# Patient Record
Sex: FEMALE | Race: BLACK OR AFRICAN AMERICAN | Employment: FULL TIME | URBAN - METROPOLITAN AREA
[De-identification: names, ages, dates, MRNs, and addresses within clinical notes are randomized per-mention and may not be internally consistent; named-entity substitution may affect disease eponyms.]

---

## 2017-03-27 ENCOUNTER — ALLIED HEALTH/NURSE VISIT (OUTPATIENT)
Dept: NURSING | Facility: CLINIC | Age: 22
End: 2017-03-27
Payer: COMMERCIAL

## 2017-03-27 VITALS — HEART RATE: 106 BPM | DIASTOLIC BLOOD PRESSURE: 73 MMHG | SYSTOLIC BLOOD PRESSURE: 137 MMHG

## 2017-03-27 DIAGNOSIS — Z30.42 ENCOUNTER FOR SURVEILLANCE OF INJECTABLE CONTRACEPTIVE: Primary | ICD-10-CM

## 2017-03-27 LAB — BETA HCG QUAL IFA URINE: NEGATIVE

## 2017-03-27 PROCEDURE — 96372 THER/PROPH/DIAG INJ SC/IM: CPT

## 2017-03-27 PROCEDURE — 84703 CHORIONIC GONADOTROPIN ASSAY: CPT | Performed by: NURSE PRACTITIONER

## 2017-03-27 PROCEDURE — 99207 ZZC NO CHARGE NURSE ONLY: CPT

## 2017-03-27 NOTE — MR AVS SNAPSHOT
"              After Visit Summary   3/27/2017    Faith Navarro    MRN: [id number]           Patient Information     Date Of Birth          1995        Visit Information        Provider Department      3/27/2017 4:00 PM LARRY NURSE AB [facility]        Today's Diagnoses     Encounter for surveillance of injectable contraceptive    -  1       Follow-ups after your visit        Who to contact     If you have questions or need follow up information about today's clinic visit or your schedule please contact [facility] directly at [phone].  Normal or non-critical lab and imaging results will be communicated to you by MyChart, letter or phone within 4 business days after the clinic has received the results. If you do not hear from us within 7 days, please contact the clinic through Wholesome Petshart or phone. If you have a critical or abnormal lab result, we will notify you by phone as soon as possible.  Submit refill requests through KOTURA or call your pharmacy and they will forward the refill request to us. Please allow 3 business days for your refill to be completed.          Additional Information About Your Visit        MyChart Information     KOTURA lets you send messages to your doctor, view your test results, renew your prescriptions, schedule appointments and more. To sign up, go to [email or web address] . Click on \"Log in\" on the left side of the screen, which will take you to the Welcome page. Then click on \"Sign up Now\" on the right side of the page.     You will be asked to enter the access code listed below, as well as some personal information. Please follow the directions to create your username and password.     Your access code is: [email or web address]  Expires: 2017  4:35 PM     Your access code will  in 90 days. If you need help or a new code, please call your [facility] or [phone].        Care EveryWhere ID     This is your Care EveryWhere ID. This could be used " by other organizations to access your [facility] medical records  [id number]        Your Vitals Were     Pulse                   106            Blood Pressure from Last 3 Encounters:   03/27/17 137/73   10/03/16 122/68   09/16/16 106/66    Weight from Last 3 Encounters:   10/03/16 134 lb 14.4 oz (61.2 kg) (64 %)*   09/16/16 135 lb 3.2 oz (61.3 kg) (64 %)*   06/20/16 136 lb (61.7 kg) (66 %)*     * Growth percentiles are based on [facility] 2-20 Years data.              We Performed the Following     Beta HCG qual IFA urine     INJECTION INTRAMUSCULAR OR SUB-Q     Medroxyprogesterone inj  1mg   (Depo Provera J-Code)        Primary Care Provider Office Phone # Fax #    BAUDILIO Ortiz [phone]       [address] [place] [address]        Thank you!     Thank you for choosing [facility]  for your care. Our goal is always to provide you with excellent care. Hearing back from our patients is one way we can continue to improve our services. Please take a few minutes to complete the written survey that you may receive in the mail after your visit with us. Thank you!             Your Updated Medication List - Protect others around you: Learn how to safely use, store and throw away your medicines at www.disposemymeds.org.          This list is accurate as of: 3/27/17  4:35 PM.  Always use your most recent med list.                   Brand Name Dispense Instructions for use    medroxyPROGESTERone 150 MG/ML injection    DEPO-PROVERA    3 mL    Inject 1 mL (150 mg) into the muscle every 3 months

## 2017-03-27 NOTE — PROGRESS NOTES
BLOOD PRESSURE: 137/73    DATE OF LAST PAP or ANNUAL EXAM: No results found for: PAP  URINE HCG:negative    The following medication was given:     MEDICATION: Depo Provera 150mg  ROUTE: IM  SITE: RUQ - Gluteus  : [employer]  LOT #: [id number]  EXPIRATION: 8-1-2019  NEXT INJECTION DUE: 6-12-17 to 6-26-17  Provider: ENMA Douglas CMA([facility])

## 2017-08-02 ENCOUNTER — ALLIED HEALTH/NURSE VISIT (OUTPATIENT)
Dept: NURSING | Facility: CLINIC | Age: 22
End: 2017-08-02
Payer: COMMERCIAL

## 2017-08-02 VITALS — SYSTOLIC BLOOD PRESSURE: 120 MMHG | DIASTOLIC BLOOD PRESSURE: 60 MMHG

## 2017-08-02 DIAGNOSIS — Z30.013 ENCOUNTER FOR INITIAL PRESCRIPTION OF INJECTABLE CONTRACEPTIVE: ICD-10-CM

## 2017-08-02 DIAGNOSIS — Z30.42 ENCOUNTER FOR SURVEILLANCE OF INJECTABLE CONTRACEPTIVE: Primary | ICD-10-CM

## 2017-08-02 LAB — BETA HCG QUAL IFA URINE: NEGATIVE

## 2017-08-02 PROCEDURE — 96372 THER/PROPH/DIAG INJ SC/IM: CPT

## 2017-08-02 PROCEDURE — 84703 CHORIONIC GONADOTROPIN ASSAY: CPT | Performed by: NURSE PRACTITIONER

## 2017-08-02 PROCEDURE — 99207 ZZC NO CHARGE NURSE ONLY: CPT

## 2017-08-02 RX ORDER — MEDROXYPROGESTERONE ACETATE 150 MG/ML
150 INJECTION, SUSPENSION INTRAMUSCULAR
Qty: 3 ML | Refills: 3 | OUTPATIENT
Start: 2017-08-02 | End: 2019-09-11

## 2017-08-02 NOTE — NURSING NOTE
"Chief Complaint   Patient presents with     Imm/Inj     Depo Provera inj       Initial /60  Breastfeeding? No Estimated body mass index is 21.77 kg/(m^2) as calculated from the following:    Height as of 10/3/16: 5' 6\" (1.676 m).    Weight as of 10/3/16: 134 lb 14.4 oz (61.2 kg).  Medication Reconciliation: complete   Tiesha Trotter CMA ([facility])      "

## 2017-08-02 NOTE — MR AVS SNAPSHOT
"              After Visit Summary   2017    Faith Navarro    MRN: [id number]           Patient Information     Date Of Birth          1995        Visit Information        Provider Department      2017 11:30 AM LARRY NURSE AB [facility]        Today's Diagnoses     Encounter for surveillance of injectable contraceptive    -  1    Encounter for initial prescription of injectable contraceptive           Follow-ups after your visit        Who to contact     If you have questions or need follow up information about today's clinic visit or your schedule please contact [facility] directly at [phone].  Normal or non-critical lab and imaging results will be communicated to you by [employer]hart, letter or phone within 4 business days after the clinic has received the results. If you do not hear from us within 7 days, please contact the clinic through Zixit or phone. If you have a critical or abnormal lab result, we will notify you by phone as soon as possible.  Submit refill requests through CardinalCommerce or call your pharmacy and they will forward the refill request to us. Please allow 3 business days for your refill to be completed.          Additional Information About Your Visit        MyChart Information     CardinalCommerce lets you send messages to your doctor, view your test results, renew your prescriptions, schedule appointments and more. To sign up, go to [email or web address] . Click on \"Log in\" on the left side of the screen, which will take you to the Welcome page. Then click on \"Sign up Now\" on the right side of the page.     You will be asked to enter the access code listed below, as well as some personal information. Please follow the directions to create your username and password.     Your access code is: [email or web address]  Expires: 2017  2:32 PM     Your access code will  in 90 days. If you need help or a new code, please call your [facility] or [phone].        Care " EveryWhere ID     This is your Care EveryWhere ID. This could be used by other organizations to access your [facility] medical records  [id number]        Your Vitals Were     Breastfeeding?                   No            Blood Pressure from Last 3 Encounters:   08/02/17 120/60   03/27/17 137/73   10/03/16 122/68    Weight from Last 3 Encounters:   10/03/16 134 lb 14.4 oz (61.2 kg) (64 %)*   09/16/16 135 lb 3.2 oz (61.3 kg) (64 %)*   06/20/16 136 lb (61.7 kg) (66 %)*     * Growth percentiles are based on [facility] 2-20 Years data.              We Performed the Following     Beta HCG qual IFA urine          Where to get your medicines      Some of these will need a paper prescription and others can be bought over the counter.  Ask your nurse if you have questions.     You don't need a prescription for these medications     medroxyPROGESTERone 150 MG/ML injection          Primary Care Provider Office Phone # Fax #    BAUDILIO Ortiz [phone]       [facility] [place] [address]        Equal Access to Services     [facility]: Hadii josie gibbonso Sojosé luis, waaxda luqadaha, qaybta kaalmada jude, jones mendoza . So [facility] [phone].    ATENCIÓN: Si habla español, tiene a soria disposición servicios gratuitos de asistencia lingüística. [facility] [phone].    We comply with applicable federal civil rights laws and [place] laws. We do not discriminate on the basis of race, color, national origin, age, disability sex, sexual orientation or gender identity.            Thank you!     Thank you for choosing [facility]  for your care. Our goal is always to provide you with excellent care. Hearing back from our patients is one way we can continue to improve our services. Please take a few minutes to complete the written survey that you may receive in the mail after your visit with us. Thank you!             Your Updated Medication List -  Protect others around you: Learn how to safely use, store and throw away your medicines at www.disposemymeds.org.          This list is accurate as of: 8/2/17 11:59 PM.  Always use your most recent med list.                   Brand Name Dispense Instructions for use Diagnosis    medroxyPROGESTERone 150 MG/ML injection    DEPO-PROVERA    3 mL    Inject 1 mL (150 mg) into the muscle every 3 months    Encounter for initial prescription of injectable contraceptive

## 2017-08-02 NOTE — Clinical Note
Please renew prescription for Depo Provera injection prior to 11:30 appointment today. Tiesha Trotter CMA ([facility])

## 2017-11-10 ENCOUNTER — ALLIED HEALTH/NURSE VISIT (OUTPATIENT)
Dept: NURSING | Facility: CLINIC | Age: 22
End: 2017-11-10
Payer: COMMERCIAL

## 2017-11-10 VITALS
SYSTOLIC BLOOD PRESSURE: 108 MMHG | BODY MASS INDEX: 23.61 KG/M2 | HEART RATE: 79 BPM | DIASTOLIC BLOOD PRESSURE: 74 MMHG | WEIGHT: 146.3 LBS

## 2017-11-10 PROCEDURE — 96372 THER/PROPH/DIAG INJ SC/IM: CPT

## 2017-11-10 PROCEDURE — 99207 ZZC NO CHARGE NURSE ONLY: CPT

## 2017-11-10 NOTE — PROGRESS NOTES
BP: 108/74    LAST PAP/EXAM: No results found for: PAP  URINE HCG:negative    The following medication was given:     MEDICATION: Depo Provera 150mg  ROUTE: IM  SITE: Ventrogluteal - Right  : Teva  LOT #: [id number]    EXP:04/30/2019  NDC:7728-3570-68  NEXT INJECTION DUE: 1/26/18 - 2/9/18   Provider: Magdalena Nuñez MA// November 10, 2017 10:56 AM

## 2017-11-10 NOTE — NURSING NOTE
"Chief Complaint   Patient presents with     Allied Health Visit       Initial /74  Pulse 79  Wt 146 lb 4.8 oz (66.4 kg)  BMI 23.61 kg/m2 Estimated body mass index is 23.61 kg/(m^2) as calculated from the following:    Height as of 10/3/16: 5' 6\" (1.676 m).    Weight as of this encounter: 146 lb 4.8 oz (66.4 kg).  Medication Reconciliation: complete   Magdalena Nuñez MA// November 10, 2017 11:00 AM          "

## 2017-11-10 NOTE — MR AVS SNAPSHOT
"              After Visit Summary   11/10/2017    Faith Navarro    MRN: [id number]           Patient Information     Date Of Birth          1995        Visit Information        Provider Department      11/10/2017 10:30 AM LARRY NURSE AB [facility]        Today's Diagnoses     Contraception    -  1       Follow-ups after your visit        Your next 10 appointments already scheduled     Nov 17, 2017 10:30 AM CST   [facility]    [address]  [address]  [facility] [facility] [address] [phone]           Bring a current list of meds and any records pertaining to this visit. For Physicals, please bring immunization records and any forms needing to be filled out. Please arrive 10 minutes early to complete paperwork.              Who to contact     If you have questions or need follow up information about today's clinic visit or your schedule please contact [facility] directly at [phone].  Normal or non-critical lab and imaging results will be communicated to you by Bindohart, letter or phone within 4 business days after the clinic has received the results. If you do not hear from us within 7 days, please contact the clinic through DiJiPOPt or phone. If you have a critical or abnormal lab result, we will notify you by phone as soon as possible.  Submit refill requests through MeritBuilder or call your pharmacy and they will forward the refill request to us. Please allow 3 business days for your refill to be completed.          Additional Information About Your Visit        BindoharODK Media Information     MeritBuilder lets you send messages to your doctor, view your test results, renew your prescriptions, schedule appointments and more. To sign up, go to [email or web address] . Click on \"Log in\" on the left side of the screen, which will take you to the Welcome page. Then click on \"Sign up Now\" on the right " side of the page.     You will be asked to enter the access code listed below, as well as some personal information. Please follow the directions to create your username and password.     Your access code is: [email or web address]  Expires: 2017  1:32 PM     Your access code will  in 90 days. If you need help or a new code, please call your [place] clinic or [phone].        Care EveryWhere ID     This is your Care EveryWhere ID. This could be used by other organizations to access your [place] medical records  [id number]        Your Vitals Were     Pulse BMI (Body Mass Index)                79 23.61 kg/m2           Blood Pressure from Last 3 Encounters:   11/10/17 108/74   17 120/60   17 137/73    Weight from Last 3 Encounters:   11/10/17 146 lb 4.8 oz (66.4 kg)   10/03/16 134 lb 14.4 oz (61.2 kg) (64 %)*   16 135 lb 3.2 oz (61.3 kg) (64 %)*     * Growth percentiles are based on [facility] 2-20 Years data.              We Performed the Following     INJECTION INTRAMUSCULAR OR SUB-Q     Medroxyprogesterone inj  1mg   (Depo Provera J-Code)        Primary Care Provider Office Phone # Fax #    Bushra BAUDILIO Langley [phone] [address] [place] [address]        Equal Access to Services     : Hadii aad ku hadasho Soomaali, waaxda luqadaha, qaybta kaalmada alvinda, jones mendoza . So [facility] [phone].    ATENCIÓN: Si habla español, tiene a soria disposición servicios gratuitos de asistencia lingüística. Llame al [phone].    We comply with applicable federal civil rights laws and [place] laws. We do not discriminate on the basis of race, color, national origin, age, disability, sex, sexual orientation, or gender identity.            Thank you!     Thank you for choosing [facility]  for your care. Our goal is always to provide you with excellent care. Hearing back from our patients is one way we can  continue to improve our services. Please take a few minutes to complete the written survey that you may receive in the mail after your visit with us. Thank you!             Your Updated Medication List - Protect others around you: Learn how to safely use, store and throw away your medicines at www.disposemymeds.org.          This list is accurate as of: 11/10/17 11:01 AM.  Always use your most recent med list.                   Brand Name Dispense Instructions for use Diagnosis    medroxyPROGESTERone 150 MG/ML injection    DEPO-PROVERA    3 mL    Inject 1 mL (150 mg) into the muscle every 3 months    Encounter for initial prescription of injectable contraceptive

## 2019-03-20 ENCOUNTER — NURSE TRIAGE (OUTPATIENT)
Dept: NURSING | Facility: CLINIC | Age: 24
End: 2019-03-20

## 2019-03-20 NOTE — TELEPHONE ENCOUNTER
FNA triage call :   Presenting problem : Sinus headache and stuffy nose ,  suspect low grade fever but no thermometer , and productive cough for last 2 days , . Currently : 1&0 is ok ,   Guideline used : Sinus pain or congestion - adult   Disposition and recommendations : have someone drive you to ED but Pt insists on appt and sent to .   Caller verbalizes understanding and denies further questions and will call back if further symptoms to triage or questions  . Radha Arciniega RN  - [place] Nurse Advisor     Reason for Disposition    [1] SEVERE headache AND [2] fever    Additional Information    Negative: Severe difficulty breathing (e.g., struggling for each breath, speaks in single words)    Negative: Sounds like a life-threatening emergency to the triager    Negative: [1] Sinus infection AND [2] taking an antibiotic AND [3] symptoms continue    Negative: [1] Difficulty breathing AND [2] not from stuffy nose (e.g., not relieved by cleaning out the nose)    Protocols used: SINUS PAIN OR CONGESTION-ADULT-

## 2019-03-21 ENCOUNTER — OFFICE VISIT (OUTPATIENT)
Dept: PEDIATRICS | Facility: CLINIC | Age: 24
End: 2019-03-21
Payer: COMMERCIAL

## 2019-03-21 VITALS
DIASTOLIC BLOOD PRESSURE: 56 MMHG | TEMPERATURE: 97.9 F | HEART RATE: 85 BPM | OXYGEN SATURATION: 100 % | SYSTOLIC BLOOD PRESSURE: 108 MMHG | RESPIRATION RATE: 16 BRPM | HEIGHT: 66 IN | WEIGHT: 136.2 LBS | BODY MASS INDEX: 21.89 KG/M2

## 2019-03-21 DIAGNOSIS — J06.9 UPPER RESPIRATORY TRACT INFECTION, UNSPECIFIED TYPE: Primary | ICD-10-CM

## 2019-03-21 PROCEDURE — 99213 OFFICE O/P EST LOW 20 MIN: CPT | Performed by: FAMILY MEDICINE

## 2019-03-21 RX ORDER — AZITHROMYCIN 250 MG/1
TABLET, FILM COATED ORAL
Qty: 6 TABLET | Refills: 0 | Status: SHIPPED | OUTPATIENT
Start: 2019-03-21 | End: 2019-09-11

## 2019-03-21 ASSESSMENT — MIFFLIN-ST. JEOR: SCORE: 1391.61

## 2019-03-21 NOTE — PROGRESS NOTES
"  CHIEF COMPLAINT    Sinus congestion and cough for about 1 week.      HISTORY    History as above.  She does not have a great deal of sore throat.  No ear pain.  Minimal sputum production.  No fevers.      Patient Active Problem List   Diagnosis     Periumbilical abdominal pain       REVIEW OF SYSTEMS    No fever.  No S OB.  No chest pain.  No nausea or abdominal pain.      History reviewed. No pertinent past medical history.      EXAM  /56 (BP Location: Right arm, Patient Position: Chair, Cuff Size: Adult Regular)   Pulse 85   Temp 97.9  F (36.6  C) (Oral)   Resp 16   Ht 1.664 m (5' 5.5\")   Wt 61.8 kg (136 lb 3.2 oz)   SpO2 100%   BMI 22.32 kg/m      Tympanic membranes normal.  Nasal membranes congested.  Pharynx without inflammation.  Mildly enlarged anterior cervical nodes.  Chest clear.      (J06.9) Upper respiratory tract infection, unspecified type  (primary encounter diagnosis)  Comment:   Plan: azithromycin (ZITHROMAX) 250 MG tablet              "

## 2019-06-16 ENCOUNTER — NURSE TRIAGE (OUTPATIENT)
Dept: NURSING | Facility: CLINIC | Age: 24
End: 2019-06-16

## 2019-06-17 ENCOUNTER — OFFICE VISIT (OUTPATIENT)
Dept: PEDIATRICS | Facility: CLINIC | Age: 24
End: 2019-06-17
Payer: COMMERCIAL

## 2019-06-17 VITALS
DIASTOLIC BLOOD PRESSURE: 54 MMHG | WEIGHT: 139.2 LBS | HEIGHT: 66 IN | TEMPERATURE: 99.4 F | SYSTOLIC BLOOD PRESSURE: 100 MMHG | BODY MASS INDEX: 22.37 KG/M2 | HEART RATE: 94 BPM | OXYGEN SATURATION: 100 %

## 2019-06-17 DIAGNOSIS — Z30.013 ENCOUNTER FOR INITIAL PRESCRIPTION OF INJECTABLE CONTRACEPTIVE: Primary | ICD-10-CM

## 2019-06-17 PROCEDURE — 99213 OFFICE O/P EST LOW 20 MIN: CPT | Mod: 25 | Performed by: NURSE PRACTITIONER

## 2019-06-17 PROCEDURE — 96372 THER/PROPH/DIAG INJ SC/IM: CPT | Performed by: NURSE PRACTITIONER

## 2019-06-17 RX ORDER — MEDROXYPROGESTERONE ACETATE 150 MG/ML
150 INJECTION, SUSPENSION INTRAMUSCULAR
Status: ACTIVE | OUTPATIENT
Start: 2019-06-17

## 2019-06-17 RX ADMIN — MEDROXYPROGESTERONE ACETATE 150 MG: 150 INJECTION, SUSPENSION INTRAMUSCULAR at 11:25

## 2019-06-17 ASSESSMENT — MIFFLIN-ST. JEOR: SCORE: 1400.22

## 2019-06-17 NOTE — PROGRESS NOTES
"Tarah Navarro is a 24 year old female who presents to clinic today for the following health issues:    Contraception        Birth Control---Depo    Used to be on depo - stopped last year. Hasn't had sex since LMP, which was on menses now. Has never had pap. She had STI screening 4 month ago.       Patient Active Problem List   Diagnosis     Periumbilical abdominal pain     Past Surgical History:   Procedure Laterality Date     HERNIA REPAIR         Social History     Tobacco Use     Smoking status: Never Smoker     Smokeless tobacco: Never Used   Substance Use Topics     Alcohol use: No     Alcohol/week: 0.0 oz     Family History   Problem Relation Age of Onset     Asthma Maternal Grandfather      Unknown/Adopted Father      Diabetes No family hx of      Coronary Artery Disease No family hx of      Hypertension No family hx of      Hyperlipidemia No family hx of      Breast Cancer No family hx of      Cancer - colorectal No family hx of      Cerebrovascular Disease No family hx of      Thyroid Disease No family hx of            -------------------------------------  Reviewed and updated as needed this visit by Provider  Tobacco  Allergies  Meds  Problems  Med Hx  Surg Hx  Fam Hx         Review of Systems   ROS COMP: Constitutional, HEENT, cardiovascular, pulmonary, gi and gu systems are negative, except as otherwise noted.      Objective    /54 (BP Location: Right arm, Patient Position: Sitting, Cuff Size: Adult Regular)   Pulse 94   Temp 99.4  F (37.4  C) (Oral)   Ht 1.664 m (5' 5.5\")   Wt 63.1 kg (139 lb 3.2 oz)   LMP 05/12/2019 (Approximate)   SpO2 100%   BMI 22.81 kg/m    Body mass index is 22.81 kg/m .  Physical Exam   GENERAL: healthy, alert and no distress            Assessment & Plan     1. Encounter for initial prescription of injectable contraceptive  She has been on depo before, understands med side effects, roisks, benefits. We discussed depo does not protect against " STIs. We disucssed using OTC EC if she should miss injection dates and she has unprotected intercourse. She is up to date with STI screening through school, but hasn't had pap yet. Will schedule this  - medroxyPROGESTERone (DEPO-PROVERA) syringe 150 mg  - INJECTION INTRAMUSCULAR OR SUB-Q           Return in about 3 months (around 9/17/2019) for Annual Preventative Exam.    BAUDILIO [facility]

## 2019-09-11 ENCOUNTER — OFFICE VISIT (OUTPATIENT)
Dept: OPTOMETRY | Facility: CLINIC | Age: 24
End: 2019-09-11
Payer: COMMERCIAL

## 2019-09-11 ENCOUNTER — APPOINTMENT (OUTPATIENT)
Dept: OPTOMETRY | Facility: CLINIC | Age: 24
End: 2019-09-11
Payer: COMMERCIAL

## 2019-09-11 DIAGNOSIS — H52.13 MYOPIA OF BOTH EYES: Primary | ICD-10-CM

## 2019-09-11 DIAGNOSIS — T15.01XA FOREIGN BODY OF RIGHT CORNEA, INITIAL ENCOUNTER: ICD-10-CM

## 2019-09-11 PROCEDURE — 92340 FIT SPECTACLES MONOFOCAL: CPT | Performed by: OPTOMETRIST

## 2019-09-11 PROCEDURE — 92004 COMPRE OPH EXAM NEW PT 1/>: CPT | Performed by: OPTOMETRIST

## 2019-09-11 PROCEDURE — 92015 DETERMINE REFRACTIVE STATE: CPT | Performed by: OPTOMETRIST

## 2019-09-11 ASSESSMENT — VISUAL ACUITY
OD_SC: 20/200
METHOD: SNELLEN - LINEAR
OS_SC: 20/20-1
OD_SC: 20/30
OS_SC+: -1
OS_SC: 20/125

## 2019-09-11 ASSESSMENT — REFRACTION_MANIFEST
OD_AXIS: 019
OD_CYLINDER: SPHERE
OD_SPHERE: -3.00
OS_SPHERE: -2.75
OS_SPHERE: -2.25
OS_CYLINDER: SPHERE
OS_CYLINDER: SPHERE
OD_CYLINDER: +0.75
OD_SPHERE: -3.00
METHOD_AUTOREFRACTION: 1

## 2019-09-11 ASSESSMENT — SLIT LAMP EXAM - LIDS
COMMENTS: NORMAL
COMMENTS: NORMAL

## 2019-09-11 ASSESSMENT — CUP TO DISC RATIO
OS_RATIO: 0.5
OD_RATIO: 0.5

## 2019-09-11 ASSESSMENT — TONOMETRY
IOP_METHOD: APPLANATION
OD_IOP_MMHG: 17
OS_IOP_MMHG: 17

## 2019-09-11 ASSESSMENT — EXTERNAL EXAM - RIGHT EYE: OD_EXAM: NORMAL

## 2019-09-11 ASSESSMENT — CONF VISUAL FIELD: COMMENTS: PLEASE RETEST

## 2019-09-11 ASSESSMENT — EXTERNAL EXAM - LEFT EYE: OS_EXAM: NORMAL

## 2019-09-11 NOTE — PROGRESS NOTES
Chief Complaint   Patient presents with     Annual Eye Exam       recently pulled over for driving at night , she was all over the road , lost glasses about a year ago, R eye irritation for about 2 years    Last Eye Exam: 2 years  Dilated Previously: Yes    What are you currently using to see?  does not use glasses x 1 year gives patient headache       Distance Vision Acuity: Noticed gradual change in both eyes    Near Vision Acuity: Satisfied with vision while reading  unaided    Eye Comfort: itchy tearing , light sensitive    Do you use eye drops? : No  Occupation or Hobbies: [employer]        Medical, surgical and family histories reviewed and updated 9/11/2019.       OBJECTIVE: See Ophthalmology exam    ASSESSMENT:    ICD-10-CM    1. Myopia of both eyes H52.13 EYE EXAM (SIMPLE-NONBILLABLE)     REFRACTION   2. Foreign body of right cornea, initial encounter T15.01XA         Plan:   1. Faith was advised of todays exam findings  2. Removed foreign body with sterile golf spud under topical anaesthetic.  3.  instilled erythromycin ointment in lower lid in the office  4. Begin ointment tonight at bedtime and use three times daily for 2-3 days  5. Call or rtc prn if these symptoms worsen or fail to improve.  Patient advised, should not be driving without prescription     Patient Instructions   Use antibiotic three times daily for the next 2-3 days to prevent infection  Get glasses and return to clinic for a dilation     Ashley Brito OD

## 2019-09-11 NOTE — LETTER
9/11/2019         RE: Faith Navarro  [address] [place] [address]        Dear Colleague,    Thank you [facility]arro[facility]. Please see a copy of my visit note below.    Chief Complaint   Patient presents with     Annual Eye Exam       recently pulled over for driving at night , she was all over the road , lost glasses about a year ago, R eye irritation for about 2 years    Last Eye Exam: 2 years  Dilated Previously: Yes    What are you currently using to see?  does not use glasses x 1 year gives patient headache       Distance Vision Acuity: Noticed gradual change in both eyes    Near Vision Acuity: Satisfied with vision while reading  unaided    Eye Comfort: itchy tearing , light sensitive    Do you use eye drops? : No  Occupation or Hobbies: [employer]        Medical, surgical and family histories reviewed and updated 9/11/2019.       OBJECTIVE: See Ophthalmology exam    ASSESSMENT:    ICD-10-CM    1. Myopia of both eyes H52.13 EYE EXAM (SIMPLE-NONBILLABLE)     REFRACTION   2. Foreign body of right cornea, initial encounter T15.01XA         Plan:   1. Faith was advised of todays exam findings  2. Removed foreign body with sterile golf spud under topical anaesthetic.  3.  instilled erythromycin ointment in lower lid in the office  4. Begin ointment tonight at bedtime and use three times daily for 2-3 days  5. Call or rtc prn if these symptoms worsen or fail to improve.  Patient advised, should not be driving without prescription     Patient Instructions   Use antibiotic three times daily for the next 2-3 days to prevent infection  Get glasses and return to clinic for a dilation     Ashley Brito OD     Again, thank you for allowing me to participate in the care of your patient.        Sincerely,        Ashley Brito, OD

## 2019-09-11 NOTE — PATIENT INSTRUCTIONS
Use antibiotic three times daily for the next 2-3 days to prevent infection  Get glasses and return to clinic for a dilation

## 2019-09-12 ENCOUNTER — ANCILLARY PROCEDURE (OUTPATIENT)
Dept: GENERAL RADIOLOGY | Facility: CLINIC | Age: 24
End: 2019-09-12
Payer: COMMERCIAL

## 2019-09-12 DIAGNOSIS — A15.0 TB (PULMONARY TUBERCULOSIS): ICD-10-CM

## 2019-09-12 PROCEDURE — 71046 X-RAY EXAM CHEST 2 VIEWS: CPT

## 2019-12-30 ENCOUNTER — TELEPHONE (OUTPATIENT)
Dept: PEDIATRICS | Facility: CLINIC | Age: 24
End: 2019-12-30

## 2019-12-30 NOTE — LETTER
December 30, 2019      Faith Navarro  [address] [place] [address]        Dear Faith,       We care about your health and have reviewed your health plan including your medical conditions, medications, and lab results.  Based on this review, it is recommended that you follow up regarding the following health topic(s):  -Cervical Cancer Screening    We recommend you take the following action(s):  -schedule a PAP SMEAR EXAM which is due.  Please disregard this reminder if you have had this exam elsewhere within the last year.  It would be helpful for us to have a copy of your recent pap smear report to update your records.     Please call us at the [facility] - [phone] (or use ClassPass) to address the above recommendations.     Thank you for trusting [facility] and we appreciate the opportunity to serve you.  We look forward to supporting your healthcare needs in the future.    Healthy Regards,    Your Health Care Team  [facility]

## 2019-12-30 NOTE — TELEPHONE ENCOUNTER
Panel Management Review          Composite cancer screening  Chart review shows that this patient is due/due soon for the following Pap Smear  Summary:    Patient is due/failing the following:   PAP    Action needed:   Patient needs office visit for pap smear.    Type of outreach:    Sent letter.    Questions for provider review:    None                                                                                                                                    Sylvia Coy CMA([facility])

## 2020-03-05 ENCOUNTER — ANCILLARY PROCEDURE (OUTPATIENT)
Dept: GENERAL RADIOLOGY | Facility: CLINIC | Age: 25
End: 2020-03-05
Attending: PHYSICIAN ASSISTANT
Payer: OTHER MISCELLANEOUS

## 2020-03-05 ENCOUNTER — OFFICE VISIT (OUTPATIENT)
Dept: URGENT CARE | Facility: URGENT CARE | Age: 25
End: 2020-03-05
Payer: OTHER MISCELLANEOUS

## 2020-03-05 VITALS
HEART RATE: 75 BPM | BODY MASS INDEX: 22.78 KG/M2 | RESPIRATION RATE: 20 BRPM | SYSTOLIC BLOOD PRESSURE: 118 MMHG | DIASTOLIC BLOOD PRESSURE: 66 MMHG | WEIGHT: 139 LBS | OXYGEN SATURATION: 98 % | TEMPERATURE: 97.8 F

## 2020-03-05 DIAGNOSIS — M54.50 ACUTE MIDLINE LOW BACK PAIN WITHOUT SCIATICA: Primary | ICD-10-CM

## 2020-03-05 DIAGNOSIS — M54.50 ACUTE MIDLINE LOW BACK PAIN WITHOUT SCIATICA: ICD-10-CM

## 2020-03-05 PROCEDURE — 72080 X-RAY EXAM THORACOLMB 2/> VW: CPT

## 2020-03-05 PROCEDURE — 99214 OFFICE O/P EST MOD 30 MIN: CPT | Performed by: PHYSICIAN ASSISTANT

## 2020-03-05 RX ORDER — CYCLOBENZAPRINE HCL 10 MG
10 TABLET ORAL 3 TIMES DAILY PRN
Qty: 30 TABLET | Refills: 0 | Status: SHIPPED | OUTPATIENT
Start: 2020-03-05 | End: 2020-03-15

## 2020-03-05 RX ORDER — IBUPROFEN 800 MG/1
800 TABLET, FILM COATED ORAL EVERY 8 HOURS PRN
Qty: 21 TABLET | Refills: 0 | Status: SHIPPED | OUTPATIENT
Start: 2020-03-05 | End: 2020-03-12

## 2020-03-05 RX ORDER — IBUPROFEN 200 MG
800 TABLET ORAL ONCE
Status: COMPLETED | OUTPATIENT
Start: 2020-03-05 | End: 2020-03-05

## 2020-03-05 RX ADMIN — Medication 800 MG: at 13:03

## 2020-03-05 NOTE — LETTER
[facility]  [address]  [address]  [place] [address]  Phone: [phone]  Fax: [phone]    March 5, 2020        Faith Navarro  [address] [place] [address]          To whom it may concern:    RE: Faith Navarro    Patient was seen and treated today at our clinic.  Please excuse absences on 3/5 and 3/6.  Please limit Ms. Navarro to seated work without lifting until follow up with Sports Medicine next week.     Please contact me for questions or concerns.      Sincerely,        Gal Romero PA-C

## 2020-03-05 NOTE — PATIENT INSTRUCTIONS
Patient Education     Back Care Tips    Caring for your back  These are things you can do to prevent a recurrence of acute back pain and to reduce symptoms from chronic back pain:    Stay at a healthy weight. If you are overweight, losing weight will help most types of back pain.    Exercise is an important part of recovery from most types of back pain. The muscles behind and in front of the spine support the back. This means strengthening both the back muscles and the abdominal muscles will provide better support for your spine.     Swimming and brisk walking are good overall exercises to improve your fitness level.    Practice safe lifting methods (see below).    Practice good posture when sitting, standing, and walking. Don't sit for a long time. This puts more stress on the lower back than standing or walking.    Wear quality shoes with good arch support. Foot and ankle alignment can affect back symptoms. Don't wear high heels.    Therapeutic massage can help relax the back muscles without stretching them.    During the first 24 to 72 hours after an acute injury or flare-up of chronic back pain, put an ice pack on the painful area for 20 minutes and then remove it for 20 minutes. Do thisover a period of 60 to 90 minutes, or several times a day. As a safety precaution, don't use a heating pad at bedtime. Sleeping on a heating pad can lead to skin burns or tissue damage.    You can alternate using ice and heat.  Medicines  Talk with your healthcare provider before using medicines, especially if you have other health problems or are taking other medicines.    You may use over-the-counter medicines, such as acetaminophen, ibuprofen, or naprosyn to control pain, unless your healthcare provider prescribed other pain medicine. Talk with your healthcare provider before taking any medicines if you have a chronic condition such ass diabetes, liver or kidney disease, stomach ulcers, or digestive bleeding, or are taking  blood thinners.    Be careful if you are given prescription pain medicines, opioids, or medicine for muscle spasm. They can cause drowsiness, and affect your coordination, reflexes, and judgment. Don't drive or operate heavy machinery while taking these types of medicines. Take prescription pain medicine only as prescribed by your healthcare provider.  Lumbar stretch  This simple stretch will help relax muscle spasm and keep your back more limber. If exercise makes your back pain worse, don t do it.    Lie on your back with your knees bent and both feet on the ground.    Slowly raise your left knee to your chest as you flatten your lower back against the floor. Hold for 5 seconds.    Relax and repeat the exercise with your right knee.    Do 10 of these exercises for each leg.  Safe lifting method    Don t bend over at the waist to lift an object off the floor.  Instead, bend your knees and hips in a squat.     Keep your back and head upright    Hold the object close to your body, directly in front of you.    Straighten your legs to lift the object.     Lower the object to the floor in the reverse fashion.    If you must slide something across the floor, push it.    Posture tips  Sitting  Sit in chairs with straight backs or low-back support. Keep your knees lower than your hips, with your feet flat on the floor.  When driving, sit up straight. Adjust the seat forward so you are not leaning toward the steering wheel.  A small pillow or rolled towel behind your lower back may help if you are driving long distances.   Standing  When standing for long periods, shift most of your weight to one leg at a time. Switch legs every few minutes.   Sleeping  The best way to sleep is on your side with your knees bent. Put a low pillow under your head to support your neck in a neutral spine position. Don't use thick pillows that bend your neck to one side. Put a pillow between your legs to further relax your lower back. If you  sleep on your back, put pillows under your knees to support your legs in a slightly flexed position. Use a firm mattress. If your mattress sags, replace it, or use a 1/2-inch plywood board under the mattress to add support.  Follow-up care  Follow up with your healthcare provider, or as advised.  If X-rays, a CT scan or an MRI scan were taken, they may be reviewed by a radiologist. You will be told of any new findings that may affect your care.  Call 911  Call 911 if any of the following occur:    Trouble breathing    Confusion    Very drowsy    Fainting or loss of consciousness    Rapid or very slow heart rate    Loss of  bowel or bladder control  When to seek medical advice  Call your healthcare provider right away if any of the following occur:    Pain becomes worse or spreads to your arms or legs    Weakness or numbness in one or both arms or legs    Numbness in the groin area  Date Last Reviewed: 6/1/2016 2000-2019 [employer]. [address] [place] [address]. All rights reserved. This information is not intended as a substitute for professional medical care. Always follow your healthcare professional's instructions.           Patient Education     General Neck and Back Pain    Both neck and back pain are usually caused by injury to the muscles or ligaments of the spine. Sometimes the disks that separate each bone of the spine may cause pain by pressing on a nearby nerve. Back and neck pain may appear after a sudden twisting or bending force (such as in a car accident), or sometimes after a simple awkward movement. In either case, muscle spasm is often present and adds to the pain.  Acute neck and back pain usually gets better in 1 to 2 weeks. Pain related to disk disease, arthritis in the spinal joints or spinal stenosis (narrowing of the spinal canal) can become chronic and last for months or years.  Back and neck pain are common problems. Most people feel better in 1 or 2 weeks,  and most of the rest in 1 to 2 months. Most people can remain active.  People have and describe pain differently.    Pain can be sharp, stabbing, shooting, aching, cramping, or burning    Movement, standing, bending, lifting, sitting, or walking may worsen the pain    Pain can be localized to one spot or area, or it can be more generalized    Pain can spread or radiate upwards, downwards, to the front, or go down your arms    Muscle spasm may occur.  Most of the time mechanical problems with the muscles or spine cause the pain. it is usually caused by an injury, whether known or not, to the muscles or ligaments. While illnesses can cause back pain, it is usually not caused by a serious illness. Pain is usually related to physical activity, whether sports, exercise, work, or normal activity. Sometimes it can occur without an identifiable cause. This can happen simply by stretching or moving wrong, without noting pain at the time. Other causes include:    Overexertion, lifting, pushing, pulling incorrectly or too aggressively.    Sudden twisting, bending or stretching from an accident (car or fall), or accidental movement.    Poor posture    Poor conditioning, lack of regular exercise    Spinal disc disease or arthritis    Stress    Pregnancy, or illness like appendicitis, bladder or kidney infection, pelvic infections   Home care    For neck pain: Use a comfortable pillow that supports the head and keeps the spine in a neutral position. The position of the head should not be tilted forward or backward.    When in bed, try to find a position of comfort. A firm mattress is best. Try lying flat on your back with pillows under your knees. You can also try lying on your side with your knees bent up towards your chest and a pillow between your knees.    At first, do not try to stretch out the sore spots. If there is a strain, it is not like the good soreness you get after exercising without an injury. In this case,  stretching may make it worse.    Don't sit for long periods, as in long car rides or other travel. This puts more stress on the lower back than standing or walking.    During the first 24 to 72 hours after an injury, apply an ice pack to the painful area for 20 minutes and then remove it for 20 minutes over a period of 60 to 90 minutes or several times a day.     You can alternate ice and heat therapies. Talk with your healthcare provider about the best treatment for your back or neck pain. As a safety precaution, do not use a heating pad at bedtime. Sleeping with a heating pad can lead to skin burns or tissue damage.    Therapeutic massage can help relax the back and neck muscles without stretching them.    Be aware of safe lifting methods and do not lift anything over 15 pounds until all the pain is gone.  Medicines  Talk to your healthcare provider before using medicine, especially if you have other medical problems or are taking other medicines.    You may use over-the-counter medicine to control pain, unless another pain medicine was prescribed. If you have chronic conditions like diabetes, liver or kidney disease, stomach ulcers,  gastrointestinal bleeding, or are taking blood thinner medicines.    Be careful if you are given pain medicines, narcotics, or medicine for muscle spasm. They can cause drowsiness, and can affect your coordination, reflexes, and judgment. Do not drive or operate heavy machinery.  Follow-up care  Follow up with your healthcare provider, or as advised. Physical therapy or further tests may be needed.  If X-rays were taken, you will be notified of any new findings that may affect your care.  Call 911  Call 911 if any of the following occur:    Trouble breathing    Confusion    Very drowsy or trouble awakening    Fainting or loss of consciousness    Rapid or very slow heart rate    Loss of bowel or bladder control  When to seek medical advice  Call your healthcare provider right away if  any of these occur:    Pain becomes worse or spreads into your arms or legs    Weakness, numbness or pain in one or both arms or legs    Numbness in the groin area    Difficulty walking    Fever of 100.4 F (38 C) or higher, or as directed by your healthcare provider  Date Last Reviewed: 7/1/2016 2000-2019 [employer]. [address] [place] [address]. All rights reserved. This information is not intended as a substitute for professional medical care. Always follow your healthcare professional's instructions.

## 2020-03-05 NOTE — PROGRESS NOTES
EMPLOYER: Jamarc[employer] GARCIA  DOI: 3/4/2020 745am  REGINO: Waking a [employer] resident, lifting patients head to get ready for [employer]    SUBJECTIVE:  Chief Complaint   Patient presents with     Urgent Care     Back Pain     WC back pain      Faith SHY Navarro is a 24 year old female presents with a chief complaint Back pain.  Upper thoracic to waist. No history of back issues.  No red flags.  Headache yesterday has resolved. Exacerbated with movements.  Sitting is OK.      History reviewed. No pertinent past medical history.  No current outpatient medications on file.     Social History     Tobacco Use     Smoking status: Never Smoker     Smokeless tobacco: Never Used   Substance Use Topics     Alcohol use: No     Alcohol/week: 0.0 standard drinks       ROS:  10 point ROS negative except as listed above      EXAM:   /66   Pulse 75   Temp 97.8  F (36.6  C) (Tympanic)   Resp 20   Wt 63 kg (139 lb)   SpO2 98%   BMI 22.78 kg/m    Gen: alert and guarded movement  Extremity: straight leg negative.  Paraspinal tenderness.  No midline tenderness  GENERAL APPEARANCE: healthy, alert and no distress  CHEST: clear to auscultation  CV: regular rate and rhythm  EXTREMITIES: peripheral pulses normal  MS: no gross deformities noted, no evidence of inflammation in joints, FROM in all extremities.  SKIN: no suspicious lesions or rashes  NEURO: Normal strength and tone, sensory exam grossly normal, mentation intact and speech normal      X-ray image initially interpreted in clinic by me in order to rule out fracture/dislocation.  No evidence appreciated.    ASSESSMENT:   (M54.5) Acute midline low back pain without sciatica  (primary encounter diagnosis)  Comment: No evidence of fracture or dislocation  Plan: XR Thoracic Lumbar Standing 2 Views, ibuprofen         (ADVIL/MOTRIN) tablet 800 mg, ibuprofen         (ADVIL/MOTRIN) 800 MG tablet, cyclobenzaprine         (FLEXERIL) 10 MG tablet, SPORTS MEDICINE          REFERRAL      Follow up with PCP if symptoms worsen or fail to improve      Patient Instructions     Patient Education     Back Care Tips    Caring for your back  These are things you can do to prevent a recurrence of acute back pain and to reduce symptoms from chronic back pain:    Stay at a healthy weight. If you are overweight, losing weight will help most types of back pain.    Exercise is an important part of recovery from most types of back pain. The muscles behind and in front of the spine support the back. This means strengthening both the back muscles and the abdominal muscles will provide better support for your spine.     Swimming and brisk walking are good overall exercises to improve your fitness level.    Practice safe lifting methods (see below).    Practice good posture when sitting, standing, and walking. Don't sit for a long time. This puts more stress on the lower back than standing or walking.    Wear quality shoes with good arch support. Foot and ankle alignment can affect back symptoms. Don't wear high heels.    Therapeutic massage can help relax the back muscles without stretching them.    During the first 24 to 72 hours after an acute injury or flare-up of chronic back pain, put an ice pack on the painful area for 20 minutes and then remove it for 20 minutes. Do thisover a period of 60 to 90 minutes, or several times a day. As a safety precaution, don't use a heating pad at bedtime. Sleeping on a heating pad can lead to skin burns or tissue damage.    You can alternate using ice and heat.  Medicines  Talk with your healthcare provider before using medicines, especially if you have other health problems or are taking other medicines.    You may use over-the-counter medicines, such as acetaminophen, ibuprofen, or naprosyn to control pain, unless your healthcare provider prescribed other pain medicine. Talk with your healthcare provider before taking any medicines if you have a chronic condition  such ass diabetes, liver or kidney disease, stomach ulcers, or digestive bleeding, or are taking blood thinners.    Be careful if you are given prescription pain medicines, opioids, or medicine for muscle spasm. They can cause drowsiness, and affect your coordination, reflexes, and judgment. Don't drive or operate heavy machinery while taking these types of medicines. Take prescription pain medicine only as prescribed by your healthcare provider.  Lumbar stretch  This simple stretch will help relax muscle spasm and keep your back more limber. If exercise makes your back pain worse, don t do it.    Lie on your back with your knees bent and both feet on the ground.    Slowly raise your left knee to your chest as you flatten your lower back against the floor. Hold for 5 seconds.    Relax and repeat the exercise with your right knee.    Do 10 of these exercises for each leg.  Safe lifting method    Don t bend over at the waist to lift an object off the floor.  Instead, bend your knees and hips in a squat.     Keep your back and head upright    Hold the object close to your body, directly in front of you.    Straighten your legs to lift the object.     Lower the object to the floor in the reverse fashion.    If you must slide something across the floor, push it.    Posture tips  Sitting  Sit in chairs with straight backs or low-back support. Keep your knees lower than your hips, with your feet flat on the floor.  When driving, sit up straight. Adjust the seat forward so you are not leaning toward the steering wheel.  A small pillow or rolled towel behind your lower back may help if you are driving long distances.   Standing  When standing for long periods, shift most of your weight to one leg at a time. Switch legs every few minutes.   Sleeping  The best way to sleep is on your side with your knees bent. Put a low pillow under your head to support your neck in a neutral spine position. Don't use thick pillows that bend  your neck to one side. Put a pillow between your legs to further relax your lower back. If you sleep on your back, put pillows under your knees to support your legs in a slightly flexed position. Use a firm mattress. If your mattress sags, replace it, or use a 1/2-inch plywood board under the mattress to add support.  Follow-up care  Follow up with your healthcare provider, or as advised.  If X-rays, a CT scan or an MRI scan were taken, they may be reviewed by a radiologist. You will be told of any new findings that may affect your care.  Call 911  Call 911 if any of the following occur:    Trouble breathing    Confusion    Very drowsy    Fainting or loss of consciousness    Rapid or very slow heart rate    Loss of  bowel or bladder control  When to seek medical advice  Call your healthcare provider right away if any of the following occur:    Pain becomes worse or spreads to your arms or legs    Weakness or numbness in one or both arms or legs    Numbness in the groin area  Date Last Reviewed: 6/1/2016 2000-2019 [employer]. [address] [place] [address]. All rights reserved. This information is not intended as a substitute for professional medical care. Always follow your healthcare professional's instructions.           Patient Education     General Neck and Back Pain    Both neck and back pain are usually caused by injury to the muscles or ligaments of the spine. Sometimes the disks that separate each bone of the spine may cause pain by pressing on a nearby nerve. Back and neck pain may appear after a sudden twisting or bending force (such as in a car accident), or sometimes after a simple awkward movement. In either case, muscle spasm is often present and adds to the pain.  Acute neck and back pain usually gets better in 1 to 2 weeks. Pain related to disk disease, arthritis in the spinal joints or spinal stenosis (narrowing of the spinal canal) can become chronic and last for months  or years.  Back and neck pain are common problems. Most people feel better in 1 or 2 weeks, and most of the rest in 1 to 2 months. Most people can remain active.  People have and describe pain differently.    Pain can be sharp, stabbing, shooting, aching, cramping, or burning    Movement, standing, bending, lifting, sitting, or walking may worsen the pain    Pain can be localized to one spot or area, or it can be more generalized    Pain can spread or radiate upwards, downwards, to the front, or go down your arms    Muscle spasm may occur.  Most of the time mechanical problems with the muscles or spine cause the pain. it is usually caused by an injury, whether known or not, to the muscles or ligaments. While illnesses can cause back pain, it is usually not caused by a serious illness. Pain is usually related to physical activity, whether sports, exercise, work, or normal activity. Sometimes it can occur without an identifiable cause. This can happen simply by stretching or moving wrong, without noting pain at the time. Other causes include:    Overexertion, lifting, pushing, pulling incorrectly or too aggressively.    Sudden twisting, bending or stretching from an accident (car or fall), or accidental movement.    Poor posture    Poor conditioning, lack of regular exercise    Spinal disc disease or arthritis    Stress    Pregnancy, or illness like appendicitis, bladder or kidney infection, pelvic infections   Home care    For neck pain: Use a comfortable pillow that supports the head and keeps the spine in a neutral position. The position of the head should not be tilted forward or backward.    When in bed, try to find a position of comfort. A firm mattress is best. Try lying flat on your back with pillows under your knees. You can also try lying on your side with your knees bent up towards your chest and a pillow between your knees.    At first, do not try to stretch out the sore spots. If there is a strain, it is  not like the good soreness you get after exercising without an injury. In this case, stretching may make it worse.    Don't sit for long periods, as in long car rides or other travel. This puts more stress on the lower back than standing or walking.    During the first 24 to 72 hours after an injury, apply an ice pack to the painful area for 20 minutes and then remove it for 20 minutes over a period of 60 to 90 minutes or several times a day.     You can alternate ice and heat therapies. Talk with your healthcare provider about the best treatment for your back or neck pain. As a safety precaution, do not use a heating pad at bedtime. Sleeping with a heating pad can lead to skin burns or tissue damage.    Therapeutic massage can help relax the back and neck muscles without stretching them.    Be aware of safe lifting methods and do not lift anything over 15 pounds until all the pain is gone.  Medicines  Talk to your healthcare provider before using medicine, especially if you have other medical problems or are taking other medicines.    You may use over-the-counter medicine to control pain, unless another pain medicine was prescribed. If you have chronic conditions like diabetes, liver or kidney disease, stomach ulcers,  gastrointestinal bleeding, or are taking blood thinner medicines.    Be careful if you are given pain medicines, narcotics, or medicine for muscle spasm. They can cause drowsiness, and can affect your coordination, reflexes, and judgment. Do not drive or operate heavy machinery.  Follow-up care  Follow up with your healthcare provider, or as advised. Physical therapy or further tests may be needed.  If X-rays were taken, you will be notified of any new findings that may affect your care.  Call 911  Call 911 if any of the following occur:    Trouble breathing    Confusion    Very drowsy or trouble awakening    Fainting or loss of consciousness    Rapid or very slow heart rate    Loss of bowel or  bladder control  When to seek medical advice  Call your healthcare provider right away if any of these occur:    Pain becomes worse or spreads into your arms or legs    Weakness, numbness or pain in one or both arms or legs    Numbness in the groin area    Difficulty walking    Fever of 100.4 F (38 C) or higher, or as directed by your healthcare provider  Date Last Reviewed: 7/1/2016 2000-2019 [employer]. [address] [place] [address], [place] [id number]. All rights reserved. This information is not intended as a substitute for professional medical care. Always follow your healthcare professional's instructions.

## 2020-03-11 ENCOUNTER — THERAPY VISIT (OUTPATIENT)
Dept: PHYSICAL THERAPY | Facility: CLINIC | Age: 25
End: 2020-03-11
Payer: OTHER MISCELLANEOUS

## 2020-03-11 DIAGNOSIS — M54.50 ACUTE BILATERAL LOW BACK PAIN WITHOUT SCIATICA: ICD-10-CM

## 2020-03-11 DIAGNOSIS — M54.6 ACUTE BILATERAL THORACIC BACK PAIN: ICD-10-CM

## 2020-03-11 PROCEDURE — 97014 ELECTRIC STIMULATION THERAPY: CPT | Mod: GP | Performed by: PHYSICAL THERAPIST

## 2020-03-11 PROCEDURE — 97163 PT EVAL HIGH COMPLEX 45 MIN: CPT | Mod: GP | Performed by: PHYSICAL THERAPIST

## 2020-03-11 PROCEDURE — 97110 THERAPEUTIC EXERCISES: CPT | Mod: GP | Performed by: PHYSICAL THERAPIST

## 2020-03-11 NOTE — PROGRESS NOTES
"[facility]: Physical Therapy Initial Evaluation   Mar 11, 2020    Subjective:   Chief Complaint: whole back pain   Pain: both sides from top to bottom, worst in the middle   Numbness/Tingling: None   Weakness: in the in the back   Stiffness: None  New/Recurrent/Chronic: New  DOI/onset: 3/4/2020   Referral Date: 3/5/2020 - Gal Romero PA-C   Mechanism of onset: Was helping a patient lift there head up to get into an EZ-stand and get to breakfast when she strained her back  PMH/surgical history/trauma:    - Concussion/Dizziness (states dizziness recently may be from the medicine; says she had a concussion, but does not report any history of head trauma)  General health as reported by patient: Excellent    Medications: muscle relaxants, pain,   Occupation: Nursing assistant Job duties: lifting/carrying,   Previous Treatment (Effect): muscle relaxant (helps with the pain, but makes her feel \"lazy\" in the morning)  Imaging: 3/5/2020 - X-Ray IMPRESSION: No fractures are identified. No high-grade degenerative change. Alignment is significant for slight dextroconvex curvature of the thoracolumbar junction. Paraspinal soft tissues are unremarkable.  AM/PM: worse during the day, better with medication   Quality of Pain: aches, sometimes burns  Pain: 9/10 at present, 0/10 at best, 9/10 at worst  Worse: prolonged sitting, walking more than 20 minutes, reaching, sometimes needs help with getting dressed  Better: medication, laying on her back  Progression of Symptoms since onset: worse without the medication, but better with the medication.    Sleeping: sleeps through the night with the medication   Current Functional Status:   - walking - cannot walk more than 20 minutes before the pain increases   - sitting - cannot sit more than 20 minutes before the pain starts to increase  - Lifting - avoids lifting, on lifting restrictions, light-duty at work  - dressing - has needed help with " dressing  Previous Functional Status: no restrictions  Current HEP/exercise regimen: up and down the steps at home,  Transportation to Therapy: Independent with transportation  Live with Others: Lives with her mom,   Personal Factors: Profession - patient is very concerned about her losing her job  Red Flags:   - Patient denies the following:  Numbness/Tingling ;   - Patient reports the following:  Pain with Cough / Sneeze / Laughing ;     Patient's goal(s): Hoping to get better. Be able to go to work.       Objective:    Posture: WNL, potential increase in lumbar lordosis    Gait: Decreased right hip extension with compensatory ilial rotaiton    Lumbar AROM: (Major, Moderate, Minimal or Nil loss)  Movement Loss Gurjit Mod Min Nil Pain   Flexion   x  pdm ; had to move very slowly   Extension   x     Side Gliding L    x    Side Gliding R    x        Neurological:    Motor Deficit:  Myotomes R L   L1-2 (hip flexion) 3-* 3-*   L3 (knee extension) 4-* 4-*   L4 (ankle DF) 4 4   L5 (g. toe ext) 5 5   S1 (knee flex) 5- 5-     Reflexes:    R L   Patellar 1+ 1+ with gasp of pain   Achilles 0+ 0+     Lumbar Mobility/Spring Testing: not tested. Sensitive to light touch.     Palpation: Extremely sensitive to light touch.     Other:   - Hypertonic lumbar paraspinals.   - SLR test irritated back pain at ~50 degrees bilaterally.   - Prone lying reported zero pain ; NARAYAN was intolerable.  - Better in prone with pillows under belly compared to without.  - EPB      Assessment/Plan:    Patient is a 24 year old female with thoracic and lumbar complaints.    Patient has the following significant findings with corresponding treatment plan.                Referring Diagnosis: Acute midline low back pain without sciatica  Pain -  hot/cold therapy, manual therapy, splint/taping/bracing/orthotics, self management, education, directional preference exercise and home program  Decreased ROM/flexibility - manual therapy, therapeutic exercise,  therapeutic activity and home program  Impaired muscle performance - neuro re-education and home program  Decreased function - therapeutic activities and home program      Therapy Evaluation Codes:   1) History comprised of:   Personal factors that impact the plan of care:      Anxiety, Profession, Secondary gain and Work status.    Comorbidity factors that impact the plan of care are:      None.     Medications impacting care: Muscle relaxant and Pain.  2) Examination of Body Systems comprised of:   Body structures and functions that impact the plan of care:      Lumbar spine and Thoracic Spine.   Activity limitations that impact the plan of care are:      Bending, Dressing, Lifting, Sitting and Walking.  3) Clinical presentation characteristics are:   Unstable/Unpredictable.  4) Decision-Making    High complexity using standardized patient assessment instrument and/or measureable assessment of functional outcome.  Cumulative Therapy Evaluation is: High complexity.    Previous and current functional limitations:  (See Goal Flow Sheet for this information)    Short term and Long term goals: (See Goal Flow Sheet for this information)     Communication ability:  Patient appears to be able to clearly communicate and understand verbal and written communication and follow directions correctly.  Treatment Explanation - The following has been discussed with the patient:   RX ordered/plan of care  Anticipated outcomes  Possible risks and side effects  This patient would benefit from PT intervention to resume normal activities.   Rehab potential is good.    Frequency:  1 X week, once daily  Duration:  for 8 weeks  Discharge Plan:  Achieve all LTG.  Independent in home treatment program.  Reach maximal therapeutic benefit.    Please refer to the daily flowsheet for treatment today, total treatment time and time spent performing 1:1 timed codes.

## 2020-03-14 ENCOUNTER — OFFICE VISIT (OUTPATIENT)
Dept: ORTHOPEDICS | Facility: CLINIC | Age: 25
End: 2020-03-14

## 2020-03-14 VITALS
HEIGHT: 66 IN | DIASTOLIC BLOOD PRESSURE: 60 MMHG | SYSTOLIC BLOOD PRESSURE: 118 MMHG | BODY MASS INDEX: 22.34 KG/M2 | WEIGHT: 139 LBS

## 2020-03-14 DIAGNOSIS — S39.012A STRAIN OF LUMBAR REGION, INITIAL ENCOUNTER: ICD-10-CM

## 2020-03-14 DIAGNOSIS — S29.019A THORACIC MYOFASCIAL STRAIN, INITIAL ENCOUNTER: Primary | ICD-10-CM

## 2020-03-14 PROCEDURE — 99244 OFF/OP CNSLTJ NEW/EST MOD 40: CPT | Performed by: FAMILY MEDICINE

## 2020-03-14 RX ORDER — NAPROXEN 500 MG/1
500 TABLET ORAL 2 TIMES DAILY WITH MEALS
Qty: 60 TABLET | Refills: 1 | Status: SHIPPED | OUTPATIENT
Start: 2020-03-14 | End: 2020-09-08

## 2020-03-14 RX ORDER — METHOCARBAMOL 750 MG/1
750 TABLET, FILM COATED ORAL 2 TIMES DAILY PRN
Qty: 60 TABLET | Refills: 1 | Status: SHIPPED | OUTPATIENT
Start: 2020-03-14 | End: 2020-09-08

## 2020-03-14 ASSESSMENT — MIFFLIN-ST. JEOR: SCORE: 1399.31

## 2020-03-14 NOTE — PATIENT INSTRUCTIONS
1. Thoracic myofascial strain, initial encounter    2. Strain of lumbar region, initial encounter      -Patient has mid back and low back pain due to muscle strains that occurred at work  -Patient will continue with physical therapy and home exercise program  -Patient was start naproxen 500 mg twice a day as needed and Robaxin 750 mg twice a day as needed  -Patient will continue with light duty for the next 3 weeks  -Patient follow-up in 3 weeks for reevaluation and progression of activity at work  -Call direct clinic number [[phone]] at any time with questions or concerns.    Albert Yeo MD [facility]  [facility]

## 2020-03-14 NOTE — PROGRESS NOTES
ASSESSMENT & PLAN  Patient Instructions     1. Thoracic myofascial strain, initial encounter    2. Strain of lumbar region, initial encounter      -Patient has mid back and low back pain due to muscle strains that occurred at work  -Patient will continue with physical therapy and home exercise program  -Patient was start naproxen 500 mg twice a day as needed and Robaxin 750 mg twice a day as needed  -Patient will continue with light duty for the next 3 weeks  -Patient follow-up in 3 weeks for reevaluation and progression of activity at work  -Call direct clinic number [[phone]] at any time with questions or concerns.    Albert Yeo MD [facility]  [facility]          -----    SUBJECTIVE  Faith A Melanie is a/an 24 year old female who is seen in consultation at the request of  Gal Romero PA-C for evaluation of mid to low back pain. The patient is seen by themselves.    Onset: 10 day(s) ago. Patient describes injury as helping transfer a resident. Pain in back in started about an hour or 2 later.   Location of Pain: generalized back pain. Shoulders down to her waist, the middle back hurts the most. Constant achy pain.   Rating of Pain at worst: 9/10  Rating of Pain Currently: 7/10  Worsened by: sitting for to long, lifting things, bending forward.   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 No pertinent past medical history.  Social  History     Socioeconomic History     Marital status: Single     Spouse name: Not on file     Number of children: Not on file     Years of education: Not on file     Highest education level: Not on file   Occupational History     Not on file   Social Needs     Financial resource strain: Not on file     Food insecurity     Worry: Not on file     Inability: Not on file     Transportation needs     Medical: Not on file     Non-medical: Not on file   Tobacco Use     Smoking status: Never Smoker     Smokeless tobacco: Never Used   Substance and Sexual Activity     Alcohol use: No     Alcohol/week: 0.0 standard drinks     Drug use: No     Sexual activity: Yes     Partners: Male     Birth control/protection: Condom   Lifestyle     Physical activity     Days per week: Not on file     Minutes per session: Not on file     Stress: Not on file   Relationships     Social connections     Talks on phone: Not on file     Gets together: Not on file     Attends Rastafari service: Not on file     Active member of club or organization: Not on file     Attends meetings of clubs or organizations: Not on file     Relationship status: Not on file     Intimate partner violence     Fear of current or ex partner: Not on file     Emotionally abused: Not on file     Physically abused: Not on file     Forced sexual activity: Not on file   Other Topics Concern     Parent/sibling w/ CABG, MI or angioplasty before 65F 55M? No   Social History Narrative     Not on file         Patient's past medical, surgical, social, and family histories were reviewed today and no changes are noted.    REVIEW OF SYSTEMS:  10 point ROS is negative other than symptoms noted above in HPI, Past Medical History or as stated below  Constitutional: NEGATIVE for fever, chills, change in weight  Skin: NEGATIVE for worrisome rashes, moles or lesions  GI/: NEGATIVE for bowel or bladder changes  Neuro: NEGATIVE for weakness, dizziness or paresthesias    OBJECTIVE:  /60  "  Ht 1.664 m (5' 5.5\")   Wt 63 kg (139 lb)   BMI 22.78 kg/m     General: healthy, alert and in no distress  HEENT: no scleral icterus or conjunctival erythema  Skin: no suspicious lesions or rash. No jaundice.  CV: no pedal edema  Resp: normal respiratory effort without conversational dyspnea   Psych: normal mood and affect  Gait: normal steady gait with appropriate coordination and balance  Neuro: normal light touch sensory exam of the bilateral lower extremities.  DTR's 2+ patella and achilles bilaterally.  MSK:  THORACIC/LUMBAR SPINE  Inspection:    No gross deformity/asymmetry  Palpation:    Tender about the thoracic spinous processes, left parathoracic muscles, right parathoracic muscles, lumbar spinous processes, left para lumbar muscles and right para lumbar muscles. Otherwise remainder of landmarks are nontender.  Range of Motion:     Lumbar flexion limited slightly by pain    Lumbar extension limited slightly by pain  Strength:    Full strength throughout hips/quads/hamstrings  Special Tests:    Positive: none    Negative: straight leg raise (bilateral), slump test (bilateral), femoral stretch test (bilateral)        Independent visualization of the below image:  No results found for this or any previous visit (from the past 24 hour(s)).   THORACIC LUMBAR STANDING TWO VIEWS  3/5/2020 1:11 PM      HISTORY: Acute midline low back pain without sciatica.     COMPARISON: None.                                                                      IMPRESSION: No fractures are identified. No high-grade degenerative  change. Alignment is significant for slight dextroconvex curvature of  the thoracolumbar junction. Paraspinal soft tissues are unremarkable.     ERIC J HARTMAN, MD Albert Yeo MD [facility]    "

## 2020-03-14 NOTE — LETTER
March 14, 2020      Faith Navarro  [address]  [place] [address]        To Whom It May Concern:    Faith Navarro was seen in our clinic. She may return to work with the following: limited to light duty - lifting no greater than 15 pounds until 4/6/2020.    Sincerely,        Albert Yeo, MD

## 2020-03-14 NOTE — LETTER
3/14/2020         RE: Faith Navarro  [address] [place] [address]        Dear Colleague,    Thank you for referring your patient, Faith Navarro, to the [facility]. Please see a copy of my visit note below.    ASSESSMENT & PLAN  Patient Instructions     1. Thoracic myofascial strain, initial encounter    2. Strain of lumbar region, initial encounter      -Patient has mid back and low back pain due to muscle strains that occurred at work  -Patient will continue with physical therapy and home exercise program  -Patient was start naproxen 500 mg twice a day as needed and Robaxin 750 mg twice a day as needed  -Patient will continue with light duty for the next 3 weeks  -Patient follow-up in 3 weeks for reevaluation and progression of activity at work  -Call direct clinic number [[phone]] at any time with questions or concerns.    Albert Yeo MD [facility] Orth[facility]ics and [facility] Medicine  [facility]          -----    SUBJECTIVE  Faith Navarro is a/an 24 year old female who is seen in consultation at the request of  Gal Romero PA-C for evaluation of mid to low back pain. The patient is seen by themselves.    Onset: 10 day(s) ago. Patient describes injury as helping transfer a resident. Pain in back in started about an hour or 2 later.   Location of Pain: generalized back pain. Shoulders down to her waist, the middle back hurts the most. Constant achy pain.   Rating of Pain at worst: 9/10  Rating of Pain Currently: 7/10  Worsened by: sitting for to long, lifting things, bending forward.   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 No pertinent past medical history.  Social History     Socioeconomic History     Marital status: Single     Spouse name: Not on file     Number of children: Not on file     Years of education: Not on file     Highest education level: Not on file   Occupational History     Not on file   Social Needs     Financial resource strain: Not on file     Food insecurity     Worry: Not on file     Inability: Not on file     Transportation needs     Medical: Not on file     Non-medical: Not on file   Tobacco Use     Smoking status: Never Smoker     Smokeless tobacco: Never Used   Substance and Sexual Activity     Alcohol use: No     Alcohol/week: 0.0 standard drinks     Drug use: No     Sexual activity: Yes     Partners: Male     Birth control/protection: Condom   Lifestyle     Physical activity     Days per week: Not on file     Minutes per session: Not on file     Stress: Not on file   Relationships     Social connections     Talks on phone: Not on file     Gets together: Not on file     Attends Restoration service: Not on file     Active member of club or organization: Not on file     Attends meetings of clubs or organizations: Not on file     Relationship status: Not on file     Intimate partner violence     Fear of current or ex partner: Not on file     Emotionally abused: Not on file     Physically abused: Not on file     Forced sexual activity: Not on file   Other Topics Concern     Parent/sibling w/ CABG, MI or angioplasty before 65F 55M? No   Social History Narrative     Not on file         Patient's past medical, surgical, social, and family histories were reviewed today and no changes are noted.    REVIEW OF SYSTEMS:  10 point ROS is negative other than symptoms noted above in HPI, Past Medical History or as stated  "below  Constitutional: NEGATIVE for fever, chills, change in weight  Skin: NEGATIVE for worrisome rashes, moles or lesions  GI/: NEGATIVE for bowel or bladder changes  Neuro: NEGATIVE for weakness, dizziness or paresthesias    OBJECTIVE:  /60   Ht 1.664 m (5' 5.5\")   Wt 63 kg (139 lb)   BMI 22.78 kg/m     General: healthy, alert and in no distress  HEENT: no scleral icterus or conjunctival erythema  Skin: no suspicious lesions or rash. No jaundice.  CV: no pedal edema  Resp: normal respiratory effort without conversational dyspnea   Psych: normal mood and affect  Gait: normal steady gait with appropriate coordination and balance  Neuro: normal light touch sensory exam of the bilateral lower extremities.  DTR's 2+ patella and achilles bilaterally.  MSK:  THORACIC/LUMBAR SPINE  Inspection:    No gross deformity/asymmetry  Palpation:    Tender about the thoracic spinous processes, left parathoracic muscles, right parathoracic muscles, lumbar spinous processes, left para lumbar muscles and right para lumbar muscles. Otherwise remainder of landmarks are nontender.  Range of Motion:     Lumbar flexion limited slightly by pain    Lumbar extension limited slightly by pain  Strength:    Full strength throughout hips/quads/hamstrings  Special Tests:    Positive: none    Negative: straight leg raise (bilateral), slump test (bilateral), femoral stretch test (bilateral)        Independent visualization of the below image:  No results found for this or any previous visit (from the past 24 hour(s)).   THORACIC LUMBAR STANDING TWO VIEWS  3/5/2020 1:11 PM      HISTORY: Acute midline low back pain without sciatica.     COMPARISON: None.                                                                      IMPRESSION: No fractures are identified. No high-grade degenerative  change. Alignment is significant for slight dextroconvex curvature of  the thoracolumbar junction. Paraspinal soft tissues are unremarkable.     WILL LUNA" HARTMAN, MD Albert Yeo MD [facility]      Again, thank you for allowing me to participate in the care of your patient.        Sincerely,        Albert Yeo, MD

## 2020-03-31 ENCOUNTER — TELEPHONE (OUTPATIENT)
Dept: ORTHOPEDICS | Facility: CLINIC | Age: 25
End: 2020-03-31

## 2020-03-31 NOTE — LETTER
March 31, 2020      Faith Navarro  [address] [place] [address]        To Whom It May Concern:    Faith Navarro was seen in our clinic. She may return to work on 4/1/20 without restrictions.      Sincerely,        Albert Yeo, MD

## 2020-03-31 NOTE — TELEPHONE ENCOUNTER
Called patient back. She would like the letter sent to her house. She is hoping to have the letter by Friday. Writer stated they will try and get the letter in the mail today, but can't guarantee they will get it by Friday. Told patient she can also call back with a fax number and we can fax it some where for her.     Confirmed address on file was correct. Patient acknowledged address was correct and confirmed the plan. Patient was appreciative of the phone call back.     Letter was printed out and mailed.     Marely Goel MS, ATC

## 2020-03-31 NOTE — TELEPHONE ENCOUNTER
Patient LVM that she saw Dr. Yeo on the 14th and has done therapy. She would like him to know that she is feeling much better and would like to go back to work without restrictions.     Please advise, letter pending.    Previous workability had her on light duty - no lifting greater than 15lbs until 4/6/20    Germaine Weiner ATC

## 2020-04-02 ENCOUNTER — TELEPHONE (OUTPATIENT)
Dept: PHYSICAL THERAPY | Facility: CLINIC | Age: 25
End: 2020-04-02

## 2020-04-02 DIAGNOSIS — M54.6 ACUTE BILATERAL THORACIC BACK PAIN: ICD-10-CM

## 2020-04-02 DIAGNOSIS — M54.50 ACUTE BILATERAL LOW BACK PAIN WITHOUT SCIATICA: ICD-10-CM

## 2020-04-02 NOTE — TELEPHONE ENCOUNTER
Courtesy call offering virtual visit if needed and the [facility] scheduling #.

## 2020-04-03 NOTE — TELEPHONE ENCOUNTER
Patient calls stating she needs a letter to return to work as she feels she is able to return. Discussed that letter was mailed on 3/31/20 and she has not received yet. She asks if she can pick it up. Informed she may but we are trying to limit patients and staff to exposure due to the COVID19 pandemic. Asked if she was able to get a fax number from her employer so that we may fax the letter instead. She states she will check and call us back.     COREEN Aden RN

## 2020-04-03 NOTE — TELEPHONE ENCOUNTER
Patient called back with number to fax. She immediately called back again and gave a corrected fax: [phone]  Attn : Nilsa Shepherd/[facility].   Informed will fax letter.     Letter faxed and confirmed it went through via [employer].     COREEN Aden RN

## 2020-05-11 ENCOUNTER — RESULTS ONLY (OUTPATIENT)
Dept: LAB | Age: 25
End: 2020-05-11

## 2020-05-11 ENCOUNTER — APPOINTMENT (OUTPATIENT)
Dept: LAB | Facility: CLINIC | Age: 25
End: 2020-05-11

## 2020-05-13 LAB
COVID-19 SPIKE RBD ABY TITER: NORMAL
COVID-19 SPIKE RBD ABY: NEGATIVE

## 2020-05-27 ENCOUNTER — TELEPHONE (OUTPATIENT)
Dept: ORTHOPEDICS | Facility: CLINIC | Age: 25
End: 2020-05-27

## 2020-05-27 NOTE — TELEPHONE ENCOUNTER
Reason for Call:  Form, our goal is to have forms completed with 7 days, however, some forms may require a visit or additional information.    Type of letter, form or note:  work comp     Who is the form from?: [employer] - Mari Concepcion    Where did the form come from: form was faxed in    Phone number of person requesting form: n/a  Can we leave a detailed message on this number:  Not Applicable    Desired completion date of form: asap      How will form be returned?:  fax to [phone]    Has the patient signed a consent form for release of information (may be included with form)? Not Applicable    Additional comments: claim # [id number]    Form was started and place in Provider Basket for provider review/ completion at [facility].     Bushra Mejía ATC

## 2020-05-27 NOTE — TELEPHONE ENCOUNTER
Forms completed and signed by Dr. Yeo. Faxed to [phone]. Copy sent to scan.    Bushra Mejía ATC

## 2020-08-25 PROBLEM — M54.50 ACUTE BILATERAL LOW BACK PAIN WITHOUT SCIATICA: Status: RESOLVED | Noted: 2020-03-11 | Resolved: 2020-08-25

## 2020-08-25 PROBLEM — M54.6 ACUTE THORACIC BACK PAIN: Status: RESOLVED | Noted: 2020-03-11 | Resolved: 2020-08-25

## 2020-08-25 NOTE — PROGRESS NOTES
DISCHARGE REPORT    Updated as of August 25, 2020.    Discharge report is from initial evaluation on Mar 11, 2020.       SUBJECTIVE  Subjective changes noted by patient: Patient has not returned since initial evaluation.   Changes in function:  Patient has not returned to clinic to assess.   Adverse reaction to treatment or activity: Patient has not returned to clinic to assess.     OBJECTIVE  Changes noted in objective findings:  Patient has failed to return to therapy so current objective findings are unknown.  Objective: See initial evaluation note.      ASSESSMENT/PLAN  STG/LTGs have been met or progress has been made towards goals:  Goal status unknown  Assessment of Progress: Patient has not returned to therapy.  Current status is unknown and discharge G code cannot be reported.  Deana continues to require the following intervention to meet STG and LTG's:  Unknown, patient has not returned to therapy.     Recommendations:  No recommendations can accurately be made. Patient has failed to return to therapy.     Please refer to the daily flowsheet for treatment today, total treatment time and time spent performing 1:1 timed codes.

## 2020-08-30 ENCOUNTER — HOSPITAL ENCOUNTER (EMERGENCY)
Facility: CLINIC | Age: 25
Discharge: HOME | End: 2020-08-30
Attending: EMERGENCY MEDICINE | Admitting: EMERGENCY MEDICINE
Payer: OTHER MISCELLANEOUS

## 2020-08-30 ENCOUNTER — APPOINTMENT (OUTPATIENT)
Dept: GENERAL RADIOLOGY | Facility: CLINIC | Age: 25
End: 2020-08-30
Attending: EMERGENCY MEDICINE
Payer: OTHER MISCELLANEOUS

## 2020-08-30 VITALS
DIASTOLIC BLOOD PRESSURE: 77 MMHG | WEIGHT: 146 LBS | OXYGEN SATURATION: 95 % | HEIGHT: 64 IN | TEMPERATURE: 97.2 F | RESPIRATION RATE: 18 BRPM | SYSTOLIC BLOOD PRESSURE: 134 MMHG | BODY MASS INDEX: 24.92 KG/M2 | HEART RATE: 72 BPM

## 2020-08-30 DIAGNOSIS — S90.31XA CONTUSION OF RIGHT FOOT, INITIAL ENCOUNTER: ICD-10-CM

## 2020-08-30 PROCEDURE — 73630 X-RAY EXAM OF FOOT: CPT | Mod: RT

## 2020-08-30 PROCEDURE — 99283 EMERGENCY DEPT VISIT LOW MDM: CPT

## 2020-08-30 ASSESSMENT — ENCOUNTER SYMPTOMS
WOUND: 0
ARTHRALGIAS: 1
NUMBNESS: 1

## 2020-08-30 ASSESSMENT — MIFFLIN-ST. JEOR: SCORE: 1402.25

## 2020-08-30 NOTE — ED AVS SNAPSHOT
[facility] Emergency Department  [address]  [facility] [address]  Phone:  [phone]  Fax:  [phone]                                    Faith Navarro   MRN: [id number]    Department:  [facility] Emergency Department   Date of Visit:  8/30/2020           After Visit Summary Signature Page    I have received my discharge instructions, and my questions have been answered. I have discussed any challenges I see with this plan with the nurse or doctor.    ..........................................................................................................................................  Patient/Patient Representative Signature      ..........................................................................................................................................  Patient Representative Print Name and Relationship to Patient    ..................................................               ................................................  Date                                   Time    ..........................................................................................................................................  Reviewed by Signature/Title    ...................................................              ..............................................  Date                                               Time          22EPIC Rev 08/18

## 2020-08-30 NOTE — LETTER
August 30, 2020      To Whom It May Concern:      Faith Navarro was seen in our Emergency Department today, 08/30/20.  I expect her condition to improve over the next few days.  She may return to work/school when improved.    Sincerely,        Anne Gentile MD

## 2020-08-30 NOTE — ED PROVIDER NOTES
"  History   Chief Complaint:  Foot Pain    HPI   Faith Navarro is a 25 year old female with no pertinent past medical history who presents for evaluation of right foot injury that occurred this morning, about 30 minutes prior to arrival. 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 She immediately had pain, so the nursing staff at her job gave her Tylenol and she has been icing the area since it occurred. Due to her pain, so presented for evaluation.    Here, the patient endorses numbness on the top of her foot. She denies any other symptoms prompting her presentation.     Allergies:  No Known Drug Allergies     Medications:   Naprosyn  Robaxin     Past Medical History:    The patient denies any relevant past medical history.      Past Surgical History:    Hernia repair      Family History:    Unknown/adopted     Social History:  The patient was unaccompanied to the ED.  Smoking Status: Never Smoker  Smokeless Tobacco: Never Used  Alcohol Use: No  Drug Use: No  PCP: Bushra Suarez   Marital Status: Single    Review of Systems   Musculoskeletal: Positive for arthralgias.   Skin: Negative for wound.   Neurological: Positive for numbness.     Physical Exam     Patient Vitals for the past 24 hrs:   BP Temp Temp src Pulse Resp SpO2 Height Weight   08/30/20 0842 134/77 97.2  F (36.2  C) Temporal 72 18 95 % -- --   08/30/20 0841 -- -- -- -- -- -- 1.626 m (5' 4\") 66.2 kg (146 lb)     Physical Exam  General: Patient is alert and interactive when I enter the room  Head:  The scalp, face, and head appear normal  Eyes:  Conjunctivae are normal  ENT:    The nose is normal    Pinnae are normal    External acoustic canals are normal  Neck:  Trachea midline  CV:  Pulses are normal.    Resp:  No respiratory distress   Abdomen:      Soft, non-tender, non-distended  Musc:  Normal muscular tone    No major joint effusions    No asymmetric leg " swelling    Tenderness to right dorsum of foot     Minimal bruising or ecchymosis     No ankle tenderness or effusion    Able to move toes   Skin:  No rash or lesions noted  Neuro:  Speech is normal and fluent. Face is symmetric.     Moving all extremities well.   Psych: Awake. Alert.  Normal affect.  Appropriate interactions.   Emergency Department Course   Imaging:  Radiology findings were communicated with the patient who voiced understanding of the findings.    Foot XR, G/E 3 views, right  IMPRESSION:  No acute right foot fracture or dislocation. No significant joint  space narrowing. No localizing right foot soft tissue swelling.   Reading per radiology.      Emergency Department Course:  Past medical records, nursing notes, and vitals reviewed.  The patient was sent for a Foot XR, G/E 3 views, right while in the emergency department, results above.      (0848)   I performed an exam of the patient as documented above. History obtained from patient.     (0922)   I rechecked the patient and discussed results and plan of care.     Findings and plan explained to the Patient. Patient discharged home with instructions regarding supportive care, medications, and reasons to return. The importance of close follow-up was reviewed. I personally reviewed the imaging results with the Patient and answered all related questions prior to discharge.     Impression & Plan   Medical Decision Making:  Faith Navarro is a 25 year old female who presented the ED today for evaluation of right foot pain.  Details of patient's history can be noted in the HPI.  Differential diagnosis included sprain, strain, fracture, tendon rupture, nerve impingement, compartment syndrome, soft tissue injury, amongst others.  X-ray was negative here today.  I suspect bony contusion and soft tissue injury.  I advised rest, ice, elevation, Tylenol, ibuprofen at home to help with pain.  Their exam was negative for other injury. All questions were  answered prior to the patient's discharge.    Diagnosis:    ICD-10-CM    1. Contusion of right foot, initial encounter  S90.31XA      Disposition:  Discharged to home.    Scribe Disclosure:  I, eLe Valdez, am serving as a scribe at 8:45 AM on 8/30/2020 to document services personally performed by Anne Gentile MD based on my observations and the provider's statements to me.  August 30, 2020   [facility] EMERGENCY DEPARTMENT        Anne Gentile MD  08/30/20 0954

## 2020-09-01 ENCOUNTER — VIRTUAL VISIT (OUTPATIENT)
Dept: PEDIATRICS | Facility: CLINIC | Age: 25
End: 2020-09-01

## 2020-09-01 DIAGNOSIS — S99.921D INJURY OF RIGHT FOOT, SUBSEQUENT ENCOUNTER: Primary | ICD-10-CM

## 2020-09-01 PROCEDURE — 99213 OFFICE O/P EST LOW 20 MIN: CPT | Mod: 95 | Performed by: INTERNAL MEDICINE

## 2020-09-01 RX ORDER — NAPROXEN 500 MG/1
500 TABLET ORAL 2 TIMES DAILY WITH MEALS
Qty: 60 TABLET | Refills: 1 | Status: SHIPPED | OUTPATIENT
Start: 2020-09-01 | End: 2020-09-08

## 2020-09-01 NOTE — PROGRESS NOTES
"Faith Navarro is a 25 year old female who is being evaluated via a billable video visit.      The patient has been notified of following:     \"This video visit will be conducted via a call between you and your physician/provider. We have found that certain health care needs can be provided without the need for an in-person physical exam.  This service lets us provide the care you need with a video conversation.  If a prescription is necessary we can send it directly to your pharmacy.  If lab work is needed we can place an order for that and you can then stop by our lab to have the test done at a later time.    Video visits are billed at different rates depending on your insurance coverage.  Please reach out to your insurance provider with any questions.    If during the course of the call the physician/provider feels a video visit is not appropriate, you will not be charged for this service.\"    Patient has given verbal consent for Video visit? Yes  How would you like to obtain your AVS? Mail a copy  If you are dropped from the video visit, the video invite should be resent to: Text to cell phone: [phone]  Will anyone else be joining your video visit? No      Subjective     Faith Navarro is a 25 year old female who presents today via video visit for the following health issues:    [facility]    ED/UC Followup:    Facility:  [facility]  Date of visit: 8/30/2020  Reason for visit: right foot pain after injury at work.    Current Status: X-ray normal and felt to be bone and soft tissue contusion.  right foot still swollen no redness, moderate pain using Tylenol and has been icing it.  Still having significant pain - using tylenol and ice pack but not controlling.  Still swollen.   Walks a little but can't put full weight on it.  Feels better if uses heel to walk on it.  Is not back to work.        Video Start Time: 1:55 PM        Review of Systems         Objective           Vitals:  No vitals were obtained today " "due to virtual visit.    Physical Exam     GENERAL: Healthy, alert and no distress  EYES: Eyes grossly normal to inspection.  No discharge or erythema, or obvious scleral/conjunctival abnormalities.  RESP: No audible wheeze, cough, or visible cyanosis.  No visible retractions or increased work of breathing.    SKIN: Visible skin clear. No significant rash, abnormal pigmentation or lesions.  NEURO: Cranial nerves grossly intact.  Mentation and speech appropriate for age.  PSYCH: Mentation appears normal, affect normal/bright, judgement and insight intact, normal speech and appearance well-groomed.              Assessment & Plan     Injury of right foot, subsequent encounter  Ongoing pain.  Prescription for non-steroidal anti-inflammatory per order, continue rest and notify if not improving for podiatry referral.  Work restriction letter completed.  - naproxen (NAPROSYN) 500 MG tablet; Take 1 tablet (500 mg) by mouth 2 times daily (with meals)     BMI:   Estimated body mass index is 25.06 kg/m  as calculated from the following:    Height as of 8/30/20: 1.626 m (5' 4\").    Weight as of 8/30/20: 66.2 kg (146 lb).           See Patient Instructions    Return in about 1 week (around 9/8/2020), or if symptoms worsen or fail to improve.    Renay Miranda MD  [facility] [place]      Video-Visit Details    Type of service:  Video Visit    Video End Time:2:10 PM    Originating Location (pt. Location): Home    Distant Location (provider location):  [facility] [place]     Platform used for Video Visit: [place]        "

## 2020-09-01 NOTE — LETTER
REPORT OF WORK COMP    [facility]  [address]  [address]  [place] [address]  [phone]      PATIENT DATA    Employee Name: Faith Navarro      : 1995     #: [id number]    Work related injury: Yes  Employer at time of injury: [employer]  Employed elsewhere? No  Workers' Compensation Carrier/Managed Care Plan:  Unknown     Today's date: 2020  Date of injury: 2000  Date of first visit: 2000    PROVIDER EVALUATION: Please fill in as needed.  Please give copy to employee for employer.    1. Diagnosis: Foot contusion    2. Treatment: non-steroidal anti-inflammatory, ice and elevation.  Keep weight off foot.  3. Medication: naprosyn  NOTE: When ordering a medication, [place] Rules require Work Comp or WC on prescriptions.     4. No work from 00 to 00  5. Return to work date: Could return 9/3/00 with light duty, no standing, but unable to drive due to injury. Expect return to work in 1-2 weeks.   WITH RESTRICTIONS? Yes, with work restrictions: * Sitting/standing: Sitting/hours per day: 8;  Standing/hours per day: 0.  DURATION OF LIMITATIONS: 1 week      RESTRICTIONS: Unlimited unless listed.  Restrictions apply to home and leisure also.  If work restrictions is not available, the employee is totally disabled.    Maximum Medical Improvement (Date): TBD  Any Permanent Partial Disability? Deferred to future exam/consult.    Provider comments:        Medical Examiner: Renay Miranda MD            License or registration: [id number]    Next appointment: 1-2 weeks with podiatry if not resolved.    CC: Employer, Managed Care Plan/Payor, Patient

## 2020-09-01 NOTE — PATIENT INSTRUCTIONS
Start the naprosyn 1 pill twice daily for 2 weeks then take twice daily as needed.  Take with food to protect the stomach.      Try to ice your foot for 20 mins 2-3 times a day.    Limit putting your full weight on your foot until it starts to feel better.    Let us know if you are not getting better by Friday and we would refer you to podiatry.

## 2020-09-02 ENCOUNTER — TELEPHONE (OUTPATIENT)
Dept: PEDIATRICS | Facility: CLINIC | Age: 25
End: 2020-09-02

## 2020-09-02 NOTE — LETTER
REPORT OF WORK COMP    [facility]  [address]  [address]  [place] [address] [phone]      PATIENT DATA    Employee Name: Faith Navarro      : 1995     #: [id number]    Work related injury: Yes  Employer at time of injury: [employer]  Employed elsewhere? No  Workers' Compensation Carrier/Managed Care Plan:  Unknown     Today's date: 2020  Date of injury: 2000  Date of first visit: 2000    PROVIDER EVALUATION: Please fill in as needed.  Please give copy to employee for employer.    1. Diagnosis: Foot contusion    2. Treatment: non-steroidal anti-inflammatory, ice and elevation.  Keep weight off foot.  3. Medication: naprosyn  NOTE: When ordering a medication, [place] Rules require Work Comp or WC on prescriptions.     4. No work from 20 to 20  5. Return to work date: Could return 20 with light duty, no standing, but unable to drive due to injury. Expect return to work in 1-2 weeks.   WITH RESTRICTIONS? Yes, with work restrictions: * Sitting/standing: Sitting/hours per day: 8;  Standing/hours per day: 0.  DURATION OF LIMITATIONS: 1 week      RESTRICTIONS: Unlimited unless listed.  Restrictions apply to home and leisure also.  If work restrictions is not available, the employee is totally disabled.    Maximum Medical Improvement (Date): TBD  Any Permanent Partial Disability? Deferred to future exam/consult.    Provider comments:        Medical Examiner: Renay Miranda MD            License or registration: [id number]    Next appointment: 1-2 weeks with podiatry if not resolved.    CC: Employer, Managed Care Plan/Payor, Patient

## 2020-09-02 NOTE — TELEPHONE ENCOUNTER
On #4 I said no work until 9/9 and explained on #5 why she needs so long off work and clearly state why she needs this long off.

## 2020-09-02 NOTE — TELEPHONE ENCOUNTER
I called and with the spoke pt and she stated that based off the letter that was written #5 says that the pt could return to work on 9/3/00. Her work based off the letter is expecting her to return to light duty on 9/3/20 as noted. Please update letter or advise on next steps. Thank you.   Kathryn Mendoza on 9/2/2020 at 11:24 AM

## 2020-09-02 NOTE — TELEPHONE ENCOUNTER
The pt completed a visit with Dr. Miranda yesterday and had a workability letter written for her. She is calling in regards to #5 in that letter. It was noted that the pt could return to work on 9/3/20 but the pt states that she doesn't have a  and would like to return to work on 9/9/20.     I have pended a new letter with the changes. Please review and advise if ok to send. The letter when approved can be faxed to Kezia Nagy at [phone]. Thank you.     Kathryn Mendoza on 9/2/2020 at 9:24 AM

## 2020-09-02 NOTE — TELEPHONE ENCOUNTER
If they have a way for her to work from home or will provide transportation then she could work.  With work comp, I am supposed to say what her limitations are and the work decides if they can accommodate that. Are they going to provide transport or have her work from home?  If not, then they aren't complying with what I wrote

## 2020-09-02 NOTE — TELEPHONE ENCOUNTER
I called and relayed the providers message and the pt will follow up with her work and let her know what Dr. Miranda said and she will call us back if anything further is needed.   Kathryn Mendoza on 9/2/2020 at 11:46 AM

## 2020-09-08 ENCOUNTER — VIRTUAL VISIT (OUTPATIENT)
Dept: PEDIATRICS | Facility: CLINIC | Age: 25
End: 2020-09-08
Payer: OTHER MISCELLANEOUS

## 2020-09-08 DIAGNOSIS — S99.921D INJURY OF RIGHT FOOT, SUBSEQUENT ENCOUNTER: Primary | ICD-10-CM

## 2020-09-08 PROCEDURE — 99213 OFFICE O/P EST LOW 20 MIN: CPT | Mod: 95 | Performed by: INTERNAL MEDICINE

## 2020-09-08 NOTE — LETTER
REPORT OF WORK COMP     [facility]  [address]  [address]  [place] [address] [phone]        PATIENT DATA     Employee Name: Faith Navarro      : 1995     #: [id number]     Work related injury: Yes  Employer at time of injury: [employer]  Employed elsewhere? No  Workers' Compensation Carrier/Managed Care Plan:  Unknown     Today's date: 2020  Date of injury: 2000  Date of first visit: 2000  Today's date: 2020      PROVIDER EVALUATION:  1. Diagnosis: Foot contusion  2. Treatment:completed  3. Medication: completed - only taking over the counter ibuprofen as needed now  4. No work from 00 to 2020 and can return to work 2020    5. Return to work date: 2020  WITH RESTRICTIONS? Yes, without work restrictions:      RESTRICTIONS: Unlimited unless listed.  Restrictions apply to home and leisure also.  If work restrictions is not available, the employee is totally disabled.     Maximum Medical Improvement (Date): 2020   Any Permanent Partial Disability? No     Medical Examiner: Renay Miranda MD            License or registration: [id number]  Next appointment: [facility]

## 2020-09-08 NOTE — PROGRESS NOTES
"Faith Navarro is a 25 year old female who is being evaluated via a billable video visit.      The patient has been notified of following:     \"This video visit will be conducted via a call between you and your physician/provider. We have found that certain health care needs can be provided without the need for an in-person physical exam.  This service lets us provide the care you need with a video conversation.  If a prescription is necessary we can send it directly to your pharmacy.  If lab work is needed we can place an order for that and you can then stop by our lab to have the test done at a later time.    Video visits are billed at different rates depending on your insurance coverage.  Please reach out to your insurance provider with any questions.    If during the course of the call the physician/provider feels a video visit is not appropriate, you will not be charged for this service.\"    Patient has given verbal consent for Video visit? Yes  How would you like to obtain your AVS? MyChart  If you are dropped from the video visit, the video invite should be resent to: Text to cell phone: [phone]  Will anyone else be joining your video visit? No      Subjective     Faith Navarro is a 25 year old female who presents today via video visit for the following health issues:    HPI    Follow up on foot injury. Foot is much better.  Can now walk with full weight on foot and is able to drive.   No more swelling and bruise is fading.  Was taking naprosyn but now switched to ibuprofen.    Pt would like to discuss return letter for work without restrictions for tomorrow  Fax updated letter to Kezia Nagy at [phone]    Video Start Time: 3:26        Review of Systems         Objective           Vitals:  No vitals were obtained today due to virtual visit.    Physical Exam     GENERAL: Healthy, alert and no distress  EYES: Eyes grossly normal to inspection.  No discharge or erythema, or obvious " "scleral/conjunctival abnormalities.  RESP: No audible wheeze, cough, or visible cyanosis.  No visible retractions or increased work of breathing.    SKIN: Visible skin clear. No significant rash, abnormal pigmentation or lesions.  NEURO: Cranial nerves grossly intact.  Mentation and speech appropriate for age.  PSYCH: Mentation appears normal, affect normal/bright, judgement and insight intact, normal speech and appearance well-groomed.              Assessment & Plan     Injury of right foot, subsequent encounter  Improved.  Return to work letter completed.  Notify if symptoms flare with increase in activity       BMI:   Estimated body mass index is 25.06 kg/m  as calculated from the following:    Height as of 8/30/20: 1.626 m (5' 4\").    Weight as of 8/30/20: 66.2 kg (146 lb).               Return in about 2 weeks (around 9/22/2020), or if symptoms worsen or fail to improve.    Renay Miranda MD  [facility] [place]      Video-Visit Details    Type of service:  Video Visit    Video End Time:3:38 PM    Originating Location (pt. Location): Home    Distant Location (provider location):  [facility] [place]     Platform used for Video Visit: [employer]        "

## 2020-09-21 ENCOUNTER — TELEPHONE (OUTPATIENT)
Dept: PEDIATRICS | Facility: CLINIC | Age: 25
End: 2020-09-21

## 2020-09-21 NOTE — TELEPHONE ENCOUNTER
1st attempt l/m. Per Dr. Miranda the pt is due for her physical with Bushra or another open provider.   Kathryn Mendoza on 9/21/2020 at 11:56 AM

## 2020-12-14 ENCOUNTER — HEALTH MAINTENANCE LETTER (OUTPATIENT)
Age: 25
End: 2020-12-14

## 2020-12-14 ENCOUNTER — RESULTS ONLY (OUTPATIENT)
Dept: LAB | Age: 25
End: 2020-12-14

## 2020-12-14 ENCOUNTER — OFFICE VISIT (OUTPATIENT)
Dept: URGENT CARE | Facility: URGENT CARE | Age: 25
End: 2020-12-14

## 2020-12-14 DIAGNOSIS — Z53.9 ERRONEOUS ENCOUNTER--DISREGARD: Primary | ICD-10-CM

## 2020-12-14 LAB
SARS-COV-2 RNA SPEC QL NAA+PROBE: NORMAL
SPECIMEN SOURCE: NORMAL

## 2020-12-15 LAB
LABORATORY COMMENT REPORT: NORMAL
SARS-COV-2 RNA SPEC QL NAA+PROBE: NEGATIVE
SPECIMEN SOURCE: NORMAL

## 2021-05-20 ENCOUNTER — IMMUNIZATION (OUTPATIENT)
Dept: NURSING | Facility: CLINIC | Age: 26
End: 2021-05-20
Payer: OTHER GOVERNMENT

## 2021-05-20 PROCEDURE — 0001A PR COVID VAC PFIZER DIL RECON 30 MCG/0.3 ML IM: CPT

## 2021-05-20 PROCEDURE — 91300 PR COVID VAC PFIZER DIL RECON 30 MCG/0.3 ML IM: CPT

## 2021-06-10 ENCOUNTER — IMMUNIZATION (OUTPATIENT)
Dept: NURSING | Facility: CLINIC | Age: 26
End: 2021-06-10
Attending: INTERNAL MEDICINE
Payer: OTHER GOVERNMENT

## 2021-06-10 PROCEDURE — 0002A PR COVID VAC PFIZER DIL RECON 30 MCG/0.3 ML IM: CPT

## 2021-06-10 PROCEDURE — 91300 PR COVID VAC PFIZER DIL RECON 30 MCG/0.3 ML IM: CPT

## 2021-10-02 ENCOUNTER — HEALTH MAINTENANCE LETTER (OUTPATIENT)
Age: 26
End: 2021-10-02

## 2021-10-22 ENCOUNTER — VIRTUAL VISIT (OUTPATIENT)
Dept: PEDIATRICS | Facility: CLINIC | Age: 26
End: 2021-10-22

## 2021-10-22 DIAGNOSIS — Z91.199 NO-SHOW FOR APPOINTMENT: Primary | ICD-10-CM

## 2021-10-22 NOTE — PROGRESS NOTES
"Faith is a 26 year old who is being evaluated via a billable video visit.      How would you like to obtain your AVS? MyChart  If the video visit is dropped, the invitation should be resent by: Text to cell phone: [phone]   Will anyone else be joining your video visit? No  {If patient encounters technical issues they should call [phone] :455226}    Video Start Time: {video visit start/end time for provider to select:152948}    {PROVIDER CHARTING PREFERENCE:178169}    Subjective   Faith is a 26 year old who presents for the following health issues {ACCOMPANIED BY STATEMENT (Optional):123141}    HPI     {SUPERLIST (Optional):229996}  {additonal problems for provider to add (Optional):385686}    Review of Systems   {ROS COMP (Optional):417384}      Objective           Vitals:  No vitals were obtained today due to virtual visit.    Physical Exam   {video visit exam brief selected:852665::\"GENERAL: Healthy, alert and no distress\",\"EYES: Eyes grossly normal to inspection.  No discharge or erythema, or obvious scleral/conjunctival abnormalities.\",\"RESP: No audible wheeze, cough, or visible cyanosis.  No visible retractions or increased work of breathing.  \",\"SKIN: Visible skin clear. No significant rash, abnormal pigmentation or lesions.\",\"NEURO: Cranial nerves grossly intact.  Mentation and speech appropriate for age.\",\"PSYCH: Mentation appears normal, affect normal/bright, judgement and insight intact, normal speech and appearance well-groomed.\"}    {Diagnostic Test Results (Optional):004375}    {AMBULATORY ATTESTATION (Optional):908793}        Video-Visit Details    Type of service:  Video Visit    Video End Time:{video visit start/end time for provider to select:152948}    Originating Location (pt. Location): {video visit patient location:574796::\"Home\"}    Distant Location (provider location):  [facility]     Platform used for Video Visit: {Virtual Visit " "Platforms:178489::\"[place]\"}  "

## 2021-11-03 ENCOUNTER — APPOINTMENT (OUTPATIENT)
Dept: URGENT CARE | Facility: URGENT CARE | Age: 26
End: 2021-11-03

## 2021-11-03 ENCOUNTER — LAB REQUISITION (OUTPATIENT)
Dept: LAB | Facility: CLINIC | Age: 26
End: 2021-11-03

## 2021-11-03 LAB — SARS-COV-2 RNA RESP QL NAA+PROBE: POSITIVE

## 2021-11-03 PROCEDURE — U0003 INFECTIOUS AGENT DETECTION BY NUCLEIC ACID (DNA OR RNA); SEVERE ACUTE RESPIRATORY SYNDROME CORONAVIRUS 2 (SARS-COV-2) (CORONAVIRUS DISEASE [COVID-19]), AMPLIFIED PROBE TECHNIQUE, MAKING USE OF HIGH THROUGHPUT TECHNOLOGIES AS DESCRIBED BY CMS-2020-01-R: HCPCS | Performed by: INTERNAL MEDICINE

## 2021-11-12 ENCOUNTER — TELEPHONE (OUTPATIENT)
Dept: PEDIATRICS | Facility: CLINIC | Age: 26
End: 2021-11-12

## 2021-11-12 NOTE — TELEPHONE ENCOUNTER
Patient is scheduled for nurse only appointment today, however was Covid positive 11/03/2021, and will not be able to reschedule in-person until 11/18/2021.  Left message for patient informing her appointment will need to be rescheduled.  Requested call back and provided clinic number.  Additionally sent [employer] message to reschedule.    Ivette Bender  Lead

## 2022-01-22 ENCOUNTER — HEALTH MAINTENANCE LETTER (OUTPATIENT)
Age: 27
End: 2022-01-22

## 2022-01-24 ENCOUNTER — NURSE TRIAGE (OUTPATIENT)
Dept: NURSING | Facility: CLINIC | Age: 27
End: 2022-01-24

## 2022-01-24 NOTE — TELEPHONE ENCOUNTER
Triage call:     Patient is calling with right shoulder pain.   She states she injured her shoulder at work on Wednesday 1/19.   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 When she arrived home she experienced severe pain and has not had full mobility of her right arm.   She is taking Tylenol.   Per protocol, patient was advised to go to office now. Advised walk in clinic in [place]. She plans on going there now.     Dilia Clark RN   01/24/22 10:03 AM  [facility] Nurse Advisor  Reason for Disposition    SEVERE pain (e.g., excruciating, unable to do any normal activities)    Additional Information    Negative: Shock suspected (e.g., cold/pale/clammy skin, too weak to stand, low BP, rapid pulse)    Negative: Similar pain previously and it was from 'heart attack'    Negative: Similar pain previously and it was from 'angina' and not relieved by nitroglycerin    Negative: Sounds like a life-threatening emergency to the triager    Negative: Difficulty breathing or unusual sweating (e.g., sweating without exertion)    Negative: Pain lasting > 5 minutes and pain also present in chest (Exception: pain is clearly made worse by movement)    Negative: Age > 40 and no obvious cause and pain even when not moving the arm (Exception: pain is clearly made worse by moving arm or bending neck)    Negative: Red area or streak and fever    Negative: Entire arm is swollen    Negative: Patient sounds very sick or weak to the triager    Negative: Swollen joint and fever    Protocols used: SHOULDER PAIN-A-[place]    COVID 19 Nurse Triage Plan/Patient Instructions    Please be aware that novel coronavirus (COVID-19) may be circulating in the community. If you develop symptoms such as fever, cough, or SOB or if you have concerns about the presence of another infection including coronavirus (COVID-19), please contact your health care provider or visit [email or web address].     Disposition/Instructions    In-Person Visit  with provider recommended. Reference Visit Selection Guide.    Thank you for taking steps to prevent the spread of this virus.  o Limit your contact with others.  o Wear a simple mask to cover your cough.  o Wash your hands well and often.    Resources    [facility]: About COVID-19: [email or web address]    CDC: What to Do If You're Sick: www.cdc.gov/coronavirus/2019-ncov/about/steps-when-sick.html    CDC: Ending Home Isolation: www.cdc.gov/coronavirus/2019-ncov/hcp/disposition-in-home-patients.html     CDC: Caring for Someone: www.cdc.gov/coronavirus/2019-ncov/if-you-are-sick/care-for-someone.html     [facility]: Interim Guidance for Hospital Discharge to Home: www.health.[facility].mn./diseases/coronavirus/hcp/hospdischarge.pdf    [facility] clinical trials (COVID-19 research studies): clinicalaffairs.[facility]-clinical-trials     Below are the COVID-19 hotlines at the [place] Department of Health ([facility]). Interpreters are available.   o For health questions: Call 439-067-2166 or 1-488.183.2138 (7 a.m. to 7 p.m.)  o For questions about schools and childcare: Call 254-422-9120 or 1-205.798.8913 (7 a.m. to 7 p.m.)

## 2022-01-25 ENCOUNTER — OFFICE VISIT (OUTPATIENT)
Dept: URGENT CARE | Facility: URGENT CARE | Age: 27
End: 2022-01-25

## 2022-01-25 ENCOUNTER — ANCILLARY PROCEDURE (OUTPATIENT)
Dept: GENERAL RADIOLOGY | Facility: CLINIC | Age: 27
End: 2022-01-25
Attending: PHYSICIAN ASSISTANT
Payer: OTHER MISCELLANEOUS

## 2022-01-25 ENCOUNTER — ANCILLARY PROCEDURE (OUTPATIENT)
Dept: GENERAL RADIOLOGY | Facility: CLINIC | Age: 27
End: 2022-01-25
Attending: PHYSICIAN ASSISTANT

## 2022-01-25 VITALS
DIASTOLIC BLOOD PRESSURE: 73 MMHG | SYSTOLIC BLOOD PRESSURE: 112 MMHG | TEMPERATURE: 98 F | RESPIRATION RATE: 20 BRPM | OXYGEN SATURATION: 98 % | HEART RATE: 68 BPM

## 2022-01-25 DIAGNOSIS — M25.511 ACUTE PAIN OF RIGHT SHOULDER: ICD-10-CM

## 2022-01-25 DIAGNOSIS — M25.511 ACUTE PAIN OF RIGHT SHOULDER: Primary | ICD-10-CM

## 2022-01-25 PROCEDURE — 99214 OFFICE O/P EST MOD 30 MIN: CPT | Performed by: PHYSICIAN ASSISTANT

## 2022-01-25 PROCEDURE — 73000 X-RAY EXAM OF COLLAR BONE: CPT | Mod: RT | Performed by: RADIOLOGY

## 2022-01-25 PROCEDURE — 73030 X-RAY EXAM OF SHOULDER: CPT | Mod: RT | Performed by: RADIOLOGY

## 2022-01-25 NOTE — LETTER
[facility] [place]  [address]  [address]  [place] [address]  Phone: [phone]  Fax: [phone]    January 25, 2022        Faith Navarro  [address] [place] [address]          To whom it may concern:    RE: Faith Navarro    Patient was seen and treated today at our clinic.  I have asked she not use her right arm until follow up with sports medicine this week. Any work responsibilities should be adjusted to accommodate this request.    Please contact me for questions or concerns.      Sincerely,        Gal Romero PA-C

## 2022-01-25 NOTE — LETTER
[facility] [place]  [address]  [address]  [place] [address]  Phone: [phone]  Fax: [phone]    January 25, 2022        Faith Navarro  [address] [place] [address]          To whom it may concern:    RE: Faith A Melanie    Patient was seen and treated today at our clinic. Please excuse absences on 1/26 and 1/27/21.    Please contact me for questions or concerns.      Sincerely,        Gal Romero PA-C

## 2022-01-25 NOTE — PROGRESS NOTES
SUBJECTIVE:  Chief Complaint   Patient presents with     Urgent Care     WC R shoulder pain 1/19/22      Faith Navarro is a 26 year old female presents with a chief complaint of right shoulder pain.  The injury occurred 6 day(s) ago.   The injury happened while at work ([employer]).  Lifting a resident into bed, heard a snap. The patient complained of severe and increasing pain  and has had decreased ROM.  Pain exacerbated by movment.  Relieved by tylenol helps a little bit.  She treated it initially with ice, and Tylenol. This is the first time this type of injury has occurred to this patient.     History reviewed. No pertinent past medical history.  No current outpatient medications on file.     Social History     Tobacco Use     Smoking status: Never Smoker     Smokeless tobacco: Never Used   Substance Use Topics     Alcohol use: No     Alcohol/week: 0.0 standard drinks       ROS:  Review of systems negative except as stated above.    EXAM:   /73   Pulse 68   Temp 98  F (36.7  C)   Resp 20   SpO2 98%   Gen: healthy, alert and mild distress  Extremity: shoulder has point tenderness at clavicle and joint, decreased ROM  and pain with any movment.   There is not compromise to the distal circulation.  Pulses are +2 and CRT is brisk  GENERAL APPEARANCE: healthy, alert and no distress  CHEST: clear to auscultation  CV: regular rate and rhythm  EXTREMITIES: peripheral pulses normal  MS: no gross deformities noted, no evidence of inflammation in joints, FROM in all extremities.  SKIN: no suspicious lesions or rashes  NEURO: Normal strength and tone, sensory exam grossly normal, mentation intact and speech normal    No gross abnormalities on initial read.  Rad read pending    No results found for any visits on 01/25/22.      ASSESSMENT:   (M25.511) Acute pain of right shoulder  (primary encounter diagnosis)  Comment: given severity and duration of symptoms requesting urgent follow up with sports medicine this  week  Plan: XR Clavicle Right, XR Shoulder Right G/E 3         Views, Orthopedic  Referral      Note for excuse from work and modified work responsibilities provided    Red flags and emergent follow up discussed, and understood by patient  Follow up with PCP if symptoms worsen or fail to improve      Patient Instructions     Patient Education     Shoulder Pain with Uncertain Cause   Shoulder pain can have many causes. Pain often comes from the structures that surround the shoulder joint. These are the joint capsule, ligaments, tendons, muscles, and bursa. Pain can also come from cartilage in the joint. Cartilage can become worn out or injured. It s important to know what s causing your pain so the healthcare provider can use the correct treatment. But sometimes it s difficult to find the exact cause of shoulder pain. You may need to see a specialist (orthopedist). You may also need special tests such as a CT scan or MRI. The provider may need to use special tools to look inside the joint (arthroscopy).  Shoulder pain can be treated with a sling or a device that keeps your shoulder from moving. You can take an anti-inflammatory medicine such as ibuprofen to ease pain. You may need to do special shoulder exercises. Follow up with a specialist if the pain is severe or doesn t go away after a few weeks.  Home care  Follow these tips when caring for yourself at home:    If a sling was given to you, leave it in place for the time advised by your healthcare provider. If you aren t sure how long to wear it, ask for advice. If the sling becomes loose, adjust it so that your forearm is level with the ground. Your shoulder should feel well supported.    Put an ice pack on the injured area for 20 minutes every 1 to 2 hours the first day. You can make your own ice pack by putting ice cubes in a plastic bag. Wrap the bag in a thin towel. Continue with ice packs 3 to 4 times a day for the next 2 days. Then use the pack as  needed to ease pain and swelling.    You may use acetaminophen or ibuprofen to control pain, unless another pain medicine was prescribed. If you have chronic liver or kidney disease, talk with your healthcare provider before using these medicines. Also talk with your provider if you ve ever had a stomach ulcer or digestive bleeding.    Shoulder pain may seem worse at night, when there is less to distract you from the pain. If you sleep on your side, try to keep weight off your painful shoulder. Propping pillows behind you may stop you from rolling over onto that shoulder during sleep.     Shoulder and elbow joints can become stiff if left in a sling for too long. You should start range of motion exercises about 7 to 10 days after the injury. Talk with your provider to find out what type of exercises to do and how soon to start.    You can take the sling off to shower or bathe.  Follow-up care  Follow up with your healthcare provider if you don t start to get better in the next 5 days.  When to seek medical advice  Call your healthcare provider right away if any of these occur:    Pain or swelling gets worse or continues for more than a few days    Your hand or fingers become cold, blue, numb, or tingly    Large amount of bruising on your shoulder or upper arm    Trouble moving your hand or fingers    Weakness in your hand or fingers    Your shoulder becomes stiff    It feels like your shoulder is popping out    You are less able to do your daily activities  Sandra last reviewed this educational content on 1/1/2020 2000-2021 [employer]. All rights reserved. This information is not intended as a substitute for professional medical care. Always follow your healthcare professional's instructions.

## 2022-01-26 ENCOUNTER — TELEPHONE (OUTPATIENT)
Dept: PEDIATRICS | Facility: CLINIC | Age: 27
End: 2022-01-26

## 2022-01-26 NOTE — TELEPHONE ENCOUNTER
Received call from pt  She was seen in the UC on 1/25  Pt had thought that there were going to pain medications ordered for her  Pt called her pharmacy today     Pt states she is in a lot of pain and can barely move    Please advise    Thank you  Marlon Syed RN on 1/26/2022 at 9:22 AM

## 2022-01-27 ENCOUNTER — NURSE TRIAGE (OUTPATIENT)
Dept: NURSING | Facility: CLINIC | Age: 27
End: 2022-01-27

## 2022-01-27 ENCOUNTER — ANCILLARY PROCEDURE (OUTPATIENT)
Dept: GENERAL RADIOLOGY | Facility: CLINIC | Age: 27
End: 2022-01-27
Attending: ORTHOPAEDIC SURGERY

## 2022-01-27 ENCOUNTER — OFFICE VISIT (OUTPATIENT)
Dept: ORTHOPEDICS | Facility: CLINIC | Age: 27
End: 2022-01-27
Payer: OTHER MISCELLANEOUS

## 2022-01-27 VITALS
WEIGHT: 141 LBS | SYSTOLIC BLOOD PRESSURE: 140 MMHG | BODY MASS INDEX: 22.66 KG/M2 | HEIGHT: 66 IN | DIASTOLIC BLOOD PRESSURE: 72 MMHG

## 2022-01-27 DIAGNOSIS — S46.911A STRAIN OF RIGHT SHOULDER, INITIAL ENCOUNTER: Primary | ICD-10-CM

## 2022-01-27 DIAGNOSIS — M25.511 ACUTE PAIN OF RIGHT SHOULDER: ICD-10-CM

## 2022-01-27 PROCEDURE — 73020 X-RAY EXAM OF SHOULDER: CPT | Mod: RT | Performed by: ORTHOPAEDIC SURGERY

## 2022-01-27 PROCEDURE — 99203 OFFICE O/P NEW LOW 30 MIN: CPT | Performed by: ORTHOPAEDIC SURGERY

## 2022-01-27 RX ORDER — CYCLOBENZAPRINE HCL 10 MG
10 TABLET ORAL 3 TIMES DAILY PRN
Qty: 30 TABLET | Refills: 1 | Status: SHIPPED | OUTPATIENT
Start: 2022-01-27

## 2022-01-27 ASSESSMENT — MIFFLIN-ST. JEOR: SCORE: 1412.67

## 2022-01-27 NOTE — LETTER
[facility] [place]  [address]  [address]  [place] [address]  [phone]          January 27, 2022    RE:  Faith Navarro                                                                                                                                                       [address] [place] [address]            To whom it may concern:    Faith Navarro is under my professional care for Acute pain of right shoulder She  may return to work with the following: The employee is ABLE to return to work 1/31/22    When the patient returns to work, the following restrictions apply for 1 week, until 2/7/22:  A) light duty work: left hand work only.   B) No use of right upper extremity.     Please contact my office with any questions by written request faxed to [phone].         Sincerely,            Mahesh Chauhan MD

## 2022-01-27 NOTE — LETTER
1/27/2022         RE: Faith Navarro  [address] [place] [address]        Dear Colleague,    Thank you for referring your patient, Faith Navarro, to the [facility] [place]. Please see a copy of my visit note below.    HISTORY OF PRESENT ILLNESS:    Faith Navarro is a 26 year old female who is seen in consultation at the request of Dr. Romero for acute right shoulder pain. Onset of symptoms 1/19/22, 8 days ago. She reports injury occurred while at work. 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 She is right hand dominant.     Present symptoms: right superior shoulder pain with radiation to the lateral upper arm. Pain is sharp. Patient reports pain is very severe. She is not sleeping. Patient has been using sling for support. She reports limited motion due to pain, radiation of pain into the right pec region.   Current pain level: 9/10    Treatments tried to this point: Tylenol 1,000mg BID- patient reports running out of medication yesterday morning, ice, use of sling.   Orthopedic PMH: none     No past medical history on file.    Past Surgical History:   Procedure Laterality Date     HERNIA REPAIR         Family History   Problem Relation Age of Onset     Asthma Maternal Grandfather      Unknown/Adopted Father      Diabetes No family hx of      Coronary Artery Disease No family hx of      Hypertension No family hx of      Hyperlipidemia No family hx of      Breast Cancer No family hx of      Cancer - colorectal No family hx of      Cerebrovascular Disease No family hx of      Thyroid Disease No family hx of        Social History     Socioeconomic History     Marital status: Single     Spouse name: Not on file     Number of children: Not on file     Years of education: Not on file     Highest education level: Not on file   Occupational History     Not on file   Tobacco Use     Smoking status:  Never Smoker     Smokeless tobacco: Never Used   Substance and Sexual Activity     Alcohol use: No     Alcohol/week: 0.0 standard drinks     Drug use: No     Sexual activity: Yes     Partners: Male     Birth control/protection: Condom   Other Topics Concern     Parent/sibling w/ CABG, MI or angioplasty before 65F 55M? No   Social History Narrative     Not on file     Social Determinants of Health     Financial Resource Strain: Not on file   Food Insecurity: Not on file   Transportation Needs: Not on file   Physical Activity: Not on file   Stress: Not on file   Social Connections: Not on file   Intimate Partner Violence: Not on file   Housing Stability: Not on file       No current outpatient medications on file.       No Known Allergies    REVIEW OF SYSTEMS:  CONSTITUTIONAL:  NEGATIVE for fever, chills, change in weight  INTEGUMENTARY/SKIN:  NEGATIVE for worrisome rashes, moles or lesions  EYES:  NEGATIVE for vision changes or irritation  ENT/MOUTH:  NEGATIVE for ear, mouth and throat problems  RESP:  NEGATIVE for significant cough or SOB  BREAST:  NEGATIVE for masses, tenderness or discharge  CV:  NEGATIVE for chest pain, palpitations or peripheral edema  GI:  NEGATIVE for nausea, abdominal pain, heartburn, or change in bowel habits  :  Negative   MUSCULOSKELETAL:  See HPI above  NEURO:  NEGATIVE for weakness, dizziness or paresthesias  ENDOCRINE:  NEGATIVE for temperature intolerance, skin/hair changes  HEME/ALLERGY/IMMUNE:  NEGATIVE for bleeding problems  PSYCHIATRIC:  NEGATIVE for changes in mood or affect      PHYSICAL EXAM:  There were no vitals taken for this visit.  There is no height or weight on file to calculate BMI.   GENERAL APPEARANCE: healthy, alert and no distress   HEENT: No apparent thyroid megaly. Clear sclera with normal ocular movement  RESPIRATORY: No labored breathing  SKIN: no suspicious lesions or rashes  NEURO: Normal strength and tone, mentation intact and speech normal  VASCULAR: Good  pulses, and capillary refill   LYMPH: no lymphadenopathy   PSYCH:  mentation appears normal and affect normal/bright    MUSCULOSKELETAL:  She is in a slight acute distress  At any attempt of the right shoulder movement is very painful  Appearance of the shoulder is symmetrical without noticeable swelling, erythema, ecchymosis or warmth compared to the left shoulder  Diffuse tenderness along the deltoid  Gentle range of motion is well-tolerated  With the pain causing weakness, no gross weaknesses appreciated in terms of isometric strength in abduction and external rotation    Elbow range of motion is full  Wrist range of motion is full  Finger range of motion is full  Circulation is intact  Sensation is intact     ASSESSMENT:  Acute shoulder pain most likely deltoid strain, right      PLAN:    We obtained the axillary view of the shoulder which was unremarkable.  Based on the mechanism of injury and physical examination findings, we felt that we dealing with acute muscle strain involving the deltoid.    We recommended icing 3 times a day along with frequent pendulum exercises to minimize the chance of developing frozen shoulder.  In addition, I discouraged her from using the sling.  She is scheduled to go back to work next Tuesday and she will be working with the restriction of using the left arm only for 1 week.    She will be prescribed of Flexeril.  The medication was sent to her pharmacy of choice.    She can follow-up as needed.        Imaging Interpretation:       Right shoulder axillary view was negative for subluxation or dislocation        Recent Results (from the past 744 hour(s))   XR Clavicle Right    Narrative    SHOULDER RIGHT THREE OR MORE VIEWS, CLAVICLE RIGHT TWO VIEWS  January 25, 2022 10:42 AM     HISTORY: Acute pain of right shoulder.    COMPARISON: None.      Impression    IMPRESSION: No evidence of fracture. Joint spacing and alignment are  normal.    SHARATH TAVERA MD         SYSTEM ID:   [id number]   XR Shoulder Right G/E 3 Views    Narrative    SHOULDER RIGHT THREE OR MORE VIEWS, CLAVICLE RIGHT TWO VIEWS  January 25, 2022 10:42 AM     HISTORY: Acute pain of right shoulder.    COMPARISON: None.      Impression    IMPRESSION: No evidence of fracture. Joint spacing and alignment are  normal.    SHARATH TAVERA MD         SYSTEM ID:  [id number]         Mahesh Chauhan MD  Department of Orthopedic Surgery        Disclaimer: This note consists of symbols derived from keyboarding, dictation and/or voice recognition software. As a result, there may be errors in the script that have gone undetected. Please consider this when interpreting information found in this chart.        Again, thank you for allowing me to participate in the care of your patient.        Sincerely,        Mahesh Chauhan MD

## 2022-01-27 NOTE — TELEPHONE ENCOUNTER
Duplicate encounter. FNA documented on 1/26 telephone encounter for continuity.    Jackelyn Mcintosh RN/[place] Nurse Advisor      Reason for Disposition    Patient wants to be seen    Protocols used: RECENT MEDICAL VISIT FOR ILLNESS FOLLOW-UP CALL-A-[place]

## 2022-01-27 NOTE — PATIENT INSTRUCTIONS
Frequent pendulum exercise of the right shoulder to prevent frozen shoulder  Do the exercises for 1 minute every hour  Minimize the use of the sling  Flexeril for muscle relaxation.  The medication was sent to your pharmacy  Ice for 10 minutes 3 times a day    When you return to work on next Tuesday, there would be the work  restrictions of using the left arm only for 1 week duration.

## 2022-01-27 NOTE — LETTER
[facility] [place]  [address]  [address]  [place] [address]  [phone]          January 27, 2022    RE:  Faith Navarro                                                                                                                                                       [address] [place] [address]            To whom it may concern:    Faith Navarro is under my professional care for Acute pain of right shoulder She  may return to work with the following: The employee is ABLE to return to work 2/1/22    When the patient returns to work, the following restrictions apply for 1 week, until 2/8/22:  A) light duty work: left hand work only.   B) No use of right upper extremity.     Please contact my office with any questions by written request faxed to [phone].         Sincerely,            Mahesh Chauhan MD

## 2022-01-27 NOTE — TELEPHONE ENCOUNTER
Patient called to follow up on pain medication. FNA informed her that none was prescribed at  on 1/25 visit.     She states she has been taking Tylenol, but has ran out of it. She also asks for details of her ortho appointment today.    PLAN:  - FNA advised to have her take Tylenol until seen by ortho. She said she may be able to find a way to get Tylenol for now. May apply warm packs x 10 minutes to help with pain relief.    - Ortho appointment with Dr. Chauhan at 11:20 AM, check in time 11:05 AM. FNA informed her of address:  [address], [place] [address]  Advised that pain medication can be requested during this appointment.    Pt verbalized understanding.    Jackelyn Mcintosh RN/[place] Nurse Advisor

## 2022-01-27 NOTE — PROGRESS NOTES
HISTORY OF PRESENT ILLNESS:    Faith Navarro is a 26 year old female who is seen in consultation at the request of Dr. Romero for acute right shoulder pain. Onset of symptoms 1/19/22, 8 days ago. She reports injury occurred while at work. 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 She is right hand dominant.     Present symptoms: right superior shoulder pain with radiation to the lateral upper arm. Pain is sharp. Patient reports pain is very severe. She is not sleeping. Patient has been using sling for support. She reports limited motion due to pain, radiation of pain into the right pec region.   Current pain level: 9/10    Treatments tried to this point: Tylenol 1,000mg BID- patient reports running out of medication yesterday morning, ice, use of sling.   Orthopedic PMH: none     No past medical history on file.    Past Surgical History:   Procedure Laterality Date     HERNIA REPAIR         Family History   Problem Relation Age of Onset     Asthma Maternal Grandfather      Unknown/Adopted Father      Diabetes No family hx of      Coronary Artery Disease No family hx of      Hypertension No family hx of      Hyperlipidemia No family hx of      Breast Cancer No family hx of      Cancer - colorectal No family hx of      Cerebrovascular Disease No family hx of      Thyroid Disease No family hx of        Social History     Socioeconomic History     Marital status: Single     Spouse name: Not on file     Number of children: Not on file     Years of education: Not on file     Highest education level: Not on file   Occupational History     Not on file   Tobacco Use     Smoking status: Never Smoker     Smokeless tobacco: Never Used   Substance and Sexual Activity     Alcohol use: No     Alcohol/week: 0.0 standard drinks     Drug use: No     Sexual activity: Yes     Partners: Male     Birth control/protection: Condom   Other Topics Concern      Parent/sibling w/ CABG, MI or angioplasty before 65F 55M? No   Social History Narrative     Not on file     Social Determinants of Health     Financial Resource Strain: Not on file   Food Insecurity: Not on file   Transportation Needs: Not on file   Physical Activity: Not on file   Stress: Not on file   Social Connections: Not on file   Intimate Partner Violence: Not on file   Housing Stability: Not on file       No current outpatient medications on file.       No Known Allergies    REVIEW OF SYSTEMS:  CONSTITUTIONAL:  NEGATIVE for fever, chills, change in weight  INTEGUMENTARY/SKIN:  NEGATIVE for worrisome rashes, moles or lesions  EYES:  NEGATIVE for vision changes or irritation  ENT/MOUTH:  NEGATIVE for ear, mouth and throat problems  RESP:  NEGATIVE for significant cough or SOB  BREAST:  NEGATIVE for masses, tenderness or discharge  CV:  NEGATIVE for chest pain, palpitations or peripheral edema  GI:  NEGATIVE for nausea, abdominal pain, heartburn, or change in bowel habits  :  Negative   MUSCULOSKELETAL:  See HPI above  NEURO:  NEGATIVE for weakness, dizziness or paresthesias  ENDOCRINE:  NEGATIVE for temperature intolerance, skin/hair changes  HEME/ALLERGY/IMMUNE:  NEGATIVE for bleeding problems  PSYCHIATRIC:  NEGATIVE for changes in mood or affect      PHYSICAL EXAM:  There were no vitals taken for this visit.  There is no height or weight on file to calculate BMI.   GENERAL APPEARANCE: healthy, alert and no distress   HEENT: No apparent thyroid megaly. Clear sclera with normal ocular movement  RESPIRATORY: No labored breathing  SKIN: no suspicious lesions or rashes  NEURO: Normal strength and tone, mentation intact and speech normal  VASCULAR: Good pulses, and capillary refill   LYMPH: no lymphadenopathy   PSYCH:  mentation appears normal and affect normal/bright    MUSCULOSKELETAL:  She is in a slight acute distress  At any attempt of the right shoulder movement is very painful  Appearance of the shoulder  is symmetrical without noticeable swelling, erythema, ecchymosis or warmth compared to the left shoulder  Diffuse tenderness along the deltoid  Gentle range of motion is well-tolerated  With the pain causing weakness, no gross weaknesses appreciated in terms of isometric strength in abduction and external rotation    Elbow range of motion is full  Wrist range of motion is full  Finger range of motion is full  Circulation is intact  Sensation is intact     ASSESSMENT:  Acute shoulder pain most likely deltoid strain, right      PLAN:    We obtained the axillary view of the shoulder which was unremarkable.  Based on the mechanism of injury and physical examination findings, we felt that we dealing with acute muscle strain involving the deltoid.    We recommended icing 3 times a day along with frequent pendulum exercises to minimize the chance of developing frozen shoulder.  In addition, I discouraged her from using the sling.  She is scheduled to go back to work next Tuesday and she will be working with the restriction of using the left arm only for 1 week.    She will be prescribed of Flexeril.  The medication was sent to her pharmacy of choice.    She can follow-up as needed.        Imaging Interpretation:       Right shoulder axillary view was negative for subluxation or dislocation        Recent Results (from the past 744 hour(s))   XR Clavicle Right    Narrative    SHOULDER RIGHT THREE OR MORE VIEWS, CLAVICLE RIGHT TWO VIEWS  January 25, 2022 10:42 AM     HISTORY: Acute pain of right shoulder.    COMPARISON: None.      Impression    IMPRESSION: No evidence of fracture. Joint spacing and alignment are  normal.    SHARATH TAVERA MD         SYSTEM ID:  [id number]   XR Shoulder Right G/E 3 Views    Narrative    SHOULDER RIGHT THREE OR MORE VIEWS, CLAVICLE RIGHT TWO VIEWS  January 25, 2022 10:42 AM     HISTORY: Acute pain of right shoulder.    COMPARISON: None.      Impression    IMPRESSION: No evidence of fracture.  Joint spacing and alignment are  normal.    SHARATH TAVERA MD         SYSTEM ID:  [id number]         Mahesh Chauhan MD  Department of Orthopedic Surgery        Disclaimer: This note consists of symbols derived from keyboarding, dictation and/or voice recognition software. As a result, there may be errors in the script that have gone undetected. Please consider this when interpreting information found in this chart.

## 2022-02-03 ENCOUNTER — OFFICE VISIT (OUTPATIENT)
Dept: ORTHOPEDICS | Facility: CLINIC | Age: 27
End: 2022-02-03
Payer: OTHER MISCELLANEOUS

## 2022-02-03 VITALS — DIASTOLIC BLOOD PRESSURE: 70 MMHG | SYSTOLIC BLOOD PRESSURE: 120 MMHG

## 2022-02-03 DIAGNOSIS — S46.911A STRAIN OF RIGHT SHOULDER, INITIAL ENCOUNTER: ICD-10-CM

## 2022-02-03 DIAGNOSIS — M25.511 ACUTE PAIN OF RIGHT SHOULDER: Primary | ICD-10-CM

## 2022-02-03 PROCEDURE — 99214 OFFICE O/P EST MOD 30 MIN: CPT | Performed by: ORTHOPAEDIC SURGERY

## 2022-02-03 NOTE — LETTER
[facility] [place]  [address]  [address]  [facility] [address]  [phone]          February 3 , 2022    RE:  Faith Navarro                                                                                                                                                       [address] [place] [address]            To whom it may concern:    Faith Navarro is under my professional care for Acute pain of right shoulder She  may return to work with the following: The employee is ABLE to return to work 2/14/22    When the patient returns to work, the following restrictions apply for 1 week, until 2/8/22:  A) light duty work: left hand work only.   B) No use of right upper extremity.     MRI of the right shoulder has been ordered for further evaluation. She will follow up with my office after completion to review findings and next steps.     Please contact my office with any questions by written request faxed to [phone].         Sincerely,            Mahesh Chauhan MD

## 2022-02-03 NOTE — PATIENT INSTRUCTIONS
MRI of the RIGHT shoulder has been ordered.   The [facility] team will call you to schedule your imaging exam in the next 1-2 days. You can also call them directly at [facility] [phone] ([address], [address], [place], [place]) to schedule your appointment.      Follow up with Dr. Chauhan after completion of MRI scan.   Letter provided for work, continuation of current restrictions.    Call my office with any questions or concerns, [phone].

## 2022-02-03 NOTE — PROGRESS NOTES
HISTORY OF PRESENT ILLNESS:    Faith Navarro is a 26 year old female who is seen in follow up for right shoulder pain after injury at work. Injury occurred on 1/19/22, 15 days ago.  Present symptoms: Patient reports shoulder pain has improved since her last visit. She still carries arm in guarded position. She requests refill of muscle relaxer, she informs writer she took 3 tablets at a time at nighttime to help with sleep.  Pain located to the superior and lateral aspect of the shoulder.   Current pain level: 6/10  Treatments tried to this point: Flexeril at nighttime, Tylenol during the AM , ice, rest, use of sling,    Past Medical History: Unchanged from the visit of 1/27/22. Please refer to that note.    REVIEW OF SYSTEMS:  CONSTITUTIONAL:  NEGATIVE for fever, chills, change in weight  INTEGUMENTARY/SKIN:  NEGATIVE for worrisome rashes, moles or lesions  EYES:  NEGATIVE for vision changes or irritation  ENT/MOUTH:  NEGATIVE for ear, mouth and throat problems  RESP:  NEGATIVE for significant cough or SOB  BREAST:  NEGATIVE for masses, tenderness or discharge  CV:  NEGATIVE for chest pain, palpitations or peripheral edema  GI:  NEGATIVE for nausea, abdominal pain, heartburn, or change in bowel habits  :  Negative   MUSCULOSKELETAL:  See HPI above  NEURO:  NEGATIVE for weakness, dizziness or paresthesias  ENDOCRINE:  NEGATIVE for temperature intolerance, skin/hair changes  HEME/ALLERGY/IMMUNE:  NEGATIVE for bleeding problems  PSYCHIATRIC:  NEGATIVE for changes in mood or affect    PHYSICAL EXAM:  There were no vitals taken for this visit.  There is no height or weight on file to calculate BMI.   GENERAL APPEARANCE: healthy, alert and no distress   SKIN: no suspicious lesions or rashes  NEURO: Normal strength and tone, mentation intact and speech normal  VASCULAR:  good pulses, and cappillary refill   LYMPH: no lymphadenopathy   PSYCH:  mentation appears normal and affect normal/bright    MSK:  In mild distress  especially related to shoulder movement, right  Subtle fullness of the right shoulder compared to the left  , Right deltoid cpersisting tenderness with a palpation even to light touch  Painful range of motion although it has improved since her last visit  Distal neurovascular status is intact    IMAGING INTERPRETATION:  None taken today.      ASSESSMENT:  Right shoulder pain    PLAN:  Because of rather significant level of pain that she is very experiencing despite muscle relaxant and work activity modification, at this point, recommend MRI scan evaluation to assess possible structural damage to the shoulder.    In the meantime, we will continue with the present work restrictions of doing her work with left hand only.    I will contact her once the MRI scan result is available to guide her further.           Mahesh Chauhan MD  Dept. Orthopedic Surgery  [facility]       Disclaimer: This note consists of symbols derived from keyboarding, dictation and/or voice recognition software. As a result, there may be errors in the script that have gone undetected. Please consider this when interpreting information found in this chart.

## 2022-02-03 NOTE — LETTER
2/3/2022         RE: Faith Navarro  [address] [place] [address]        Dear Colleague,    Thank you for referring your patient, Faith Navarro, to the [facility] [place]. Please see a copy of my visit note below.    HISTORY OF PRESENT ILLNESS:    Faith Navarro is a 26 year old female who is seen in follow up for right shoulder pain after injury at work. Injury occurred on 1/19/22, 15 days ago.  Present symptoms: Patient reports shoulder pain has improved since her last visit. She still carries arm in guarded position. She requests refill of muscle relaxer, she informs writer she took 3 tablets at a time at nighttime to help with sleep.  Pain located to the superior and lateral aspect of the shoulder.   Current pain level: 6/10  Treatments tried to this point: Flexeril at nighttime, Tylenol during the AM , ice, rest, use of sling,    Past Medical History: Unchanged from the visit of 1/27/22. Please refer to that note.    REVIEW OF SYSTEMS:  CONSTITUTIONAL:  NEGATIVE for fever, chills, change in weight  INTEGUMENTARY/SKIN:  NEGATIVE for worrisome rashes, moles or lesions  EYES:  NEGATIVE for vision changes or irritation  ENT/MOUTH:  NEGATIVE for ear, mouth and throat problems  RESP:  NEGATIVE for significant cough or SOB  BREAST:  NEGATIVE for masses, tenderness or discharge  CV:  NEGATIVE for chest pain, palpitations or peripheral edema  GI:  NEGATIVE for nausea, abdominal pain, heartburn, or change in bowel habits  :  Negative   MUSCULOSKELETAL:  See HPI above  NEURO:  NEGATIVE for weakness, dizziness or paresthesias  ENDOCRINE:  NEGATIVE for temperature intolerance, skin/hair changes  HEME/ALLERGY/IMMUNE:  NEGATIVE for bleeding problems  PSYCHIATRIC:  NEGATIVE for changes in mood or affect    PHYSICAL EXAM:  There were no vitals taken for this visit.  There is no height or weight on file to calculate BMI.   GENERAL APPEARANCE: healthy, alert and no distress   SKIN:  no suspicious lesions or rashes  NEURO: Normal strength and tone, mentation intact and speech normal  VASCULAR:  good pulses, and cappillary refill   LYMPH: no lymphadenopathy   PSYCH:  mentation appears normal and affect normal/bright    MSK:  In mild distress especially related to shoulder movement, right  Subtle fullness of the right shoulder compared to the left  , Right deltoid cpersisting tenderness with a palpation even to light touch  Painful range of motion although it has improved since her last visit  Distal neurovascular status is intact    IMAGING INTERPRETATION:  None taken today.      ASSESSMENT:  Right shoulder pain    PLAN:  Because of rather significant level of pain that she is very experiencing despite muscle relaxant and work activity modification, at this point, recommend MRI scan evaluation to assess possible structural damage to the shoulder.    In the meantime, we will continue with the present work restrictions of doing her work with left hand only.    I will contact her once the MRI scan result is available to guide her further.           Mahesh Chauhan MD  Dept. Orthopedic Surgery  [facility]       Disclaimer: This note consists of symbols derived from keyboarding, dictation and/or voice recognition software. As a result, there may be errors in the script that have gone undetected. Please consider this when interpreting information found in this chart.        Again, thank you for allowing me to participate in the care of your patient.        Sincerely,        Mahesh Chauhan MD

## 2022-02-03 NOTE — LETTER
[facility] ORTHOPEDIC CLINIC [place]  [address]  [address]  [place] [address]  [phone]          February 3 , 2022    RE:  Faith Navarro                                                                                                                                                       [address] [place] [address]            To whom it may concern:    Faith Navarro is under my professional care for Acute pain of right shoulder She  may return to work with the following: The employee is ABLE to return to work    When the patient returns to work, the following restrictions apply for 1 week, until 2/14/22:  A) light duty work: left hand work only.   B) No use of right upper extremity.     MRI of the right shoulder has been ordered for further evaluation. She will follow up with my office after completion to review findings and next steps.     Please contact my office with any questions by written request faxed to [phone].         Sincerely,            Mahesh Chauhan MD

## 2022-02-08 ENCOUNTER — TELEPHONE (OUTPATIENT)
Dept: ORTHOPEDICS | Facility: CLINIC | Age: 27
End: 2022-02-08

## 2022-02-08 NOTE — LETTER
[facility] ORTHOPEDIC CLINIC [place]  [address]  [address]  [place] [address]  [phone]          February 3 , 2022    RE:  Faith Navarro                                                                                                                                                       [address] [place] [address]            To whom it may concern:    Faith Navarro is under my professional care for Acute pain of right shoulder She  may return to work with the following: The employee is ABLE to return to work on 2/10/22.  Please excuse her for missed work on 2/9/22.     When the patient returns to work, the following restrictions apply until 2/24/22:  A) light duty work: left hand work only.   B) No use of right upper extremity.     MRI of the right shoulder has been ordered for further evaluation.  MRI of the shoulder is scheduled for 2/14/22- excuse her for any missed work due to this appointment.  She will follow up with my office after completion to review findings and next steps.     Please contact my office with any questions by written request faxed to [phone].         Sincerely,            Mahesh Chauhan MD

## 2022-02-08 NOTE — LETTER
[facility] ORTHOPEDIC CLINIC [place]  [address]  [address]  [place] [address]  [phone]          February 3 , 2022    RE:  Faith Navarro                                                                                                                                                       [address] [place] [address]            To whom it may concern:    Faith Navarro is under my professional care for Acute pain of right shoulder She  may return to work with the following: The employee is ABLE to return to work on 2/10/22.  Please excuse her for missed work on 2/9/22.     When the patient returns to work, the following restrictions apply until 2/16/22:  A) light duty work: left hand work only.   B) No use of right upper extremity.     MRI of the right shoulder has been ordered for further evaluation.  MRI of the shoulder is scheduled for 2/14/22.  She will follow up with my office after completion to review findings and next steps.     Please contact my office with any questions by written request faxed to [phone].         Sincerely,            Mahesh Chauhan MD

## 2022-02-08 NOTE — TELEPHONE ENCOUNTER
Reason for call:  Message from [employer].  Hard to understand speech. Regarding an appointment Friday, and a work letter.    Please call back.    Phone number to reach patient:  Cell number on file:    Telephone Information:   Mobile [phone]       Best Time:  -    Can we leave a detailed message on this number?  NO

## 2022-02-08 NOTE — LETTER
[facility] ORTHOPEDIC CLINIC [place]  [address]  [address]  [place] [facility] [address]  [phone]          February 3 , 2022    RE:  Faith Navarro                                                                                                                                                       [address] [place] [address]            To whom it may concern:    Faith Navarro is under my professional care for Acute pain of right shoulder She  may return to work with the following: The employee is ABLE to return to work on 2/10/22.  Please excuse her for missed work on 2/9/22.     When the patient returns to work, the following restrictions apply until 2/2122:  A) light duty work: left hand work only.   B) No use of right upper extremity.     MRI of the right shoulder has been ordered for further evaluation.  MRI of the shoulder is scheduled for 2/14/22- excuse her for any missed work due to this appointment.  She will follow up with my office after completion to review findings and next steps.     Please contact my office with any questions by written request faxed to [phone].         Sincerely,            Mahesh Chauhan MD

## 2022-02-08 NOTE — LETTER
[facility] ORTHOPEDIC CLINIC [place]  [address]  [address]  [place] [address]  [phone]          February 3 , 2022    RE:  Faith Navarro                                                                                                                                                       [address] [place] [address]            To whom it may concern:    Faith Navarro is under my professional care for Acute pain of right shoulder She  may return to work with the following: The employee is ABLE to return to work on 2/10/22.  Please excuse her for missed work on 2/9/22.     When the patient returns to work, the following restrictions apply until 2/17/22:  A) light duty work: left hand work only.   B) No use of right upper extremity.     MRI of the right shoulder has been ordered for further evaluation.  MRI of the shoulder is scheduled for 2/14/22.  She will follow up with my office after completion to review findings and next steps.     Please contact my office with any questions by written request faxed to [phone].         Sincerely,            Mahesh Chauhan MD

## 2022-02-09 NOTE — TELEPHONE ENCOUNTER
Phone call to patient to discuss her appointment she made today with Dr.Yeo.    She is requesting 2 things - 1) She wants a refill of Flexeril that  had given her on 1/27/22 and 2) She is requesting a work note for today, as she left work to come to an appointment she had scheduled with Dr.Yeo, and also for a work note for this coming Monday for her scheduled MRI.    Informed patient there is not much that Dr.Yeo can do for her, as she is awaitig to get an MRI and she is being treated by . Patient agreeable to cancel appointment with Dr.Yeo, but patients asks if she can come up to discuss work notes (states she is already in parking lot).    She was recently prescribed Flexeril, currently is out, she is not taking any medications.    Advised patient over the phone to try OTC medication such as Advil or Tylenol for pain. Patient agreeable, but she again asked for work note for today and Monday. Placed patient on hold to huddle with colleague, and while on hold patient hung up phone.     Patient then came into clinic  at [place] to discuss further.   Danitza ATC, discussed work restrictions with Dr. Chauhan, see her chart note    Louisa Rubio ATC

## 2022-02-09 NOTE — TELEPHONE ENCOUNTER
Called and left message for patient to call back    Patient scheduled appointment with Dr.Yeo this afternoon, she is seeing  and awaiting to get MRI that  ordered. Unsure why she made appointment with Dr.Yeo, phone call to patient to discuss if appointment is needed    Louisa Rubio ATC

## 2022-02-09 NOTE — TELEPHONE ENCOUNTER
Letter extending work restrictions through 2/17/22 printed.  Due to provider out of office the week of 2/14/22, letter was reprinted to provide continued restrictions through 2/24/22.   Patient to schedule follow up with Dr. Chauhan to discuss MRI results.   MRI scheduled on 2/14/22 at 4:45 pm.     - As writer was bringing letter to  patient was seated in waiting area.  Writer provided letter to patient, and also confirmed at this time we would advise use of OTC Tylenol and Ibuprofen for pain. She states that she is out of the Flexeril that she was taking at nighttime, this medication was not refilled at last office visit with provider.  Patient was educated to schedule follow    Danitza Christopher ATC

## 2022-02-14 ENCOUNTER — HOSPITAL ENCOUNTER (OUTPATIENT)
Dept: MRI IMAGING | Facility: CLINIC | Age: 27
Discharge: HOME | End: 2022-02-14
Attending: ORTHOPAEDIC SURGERY | Admitting: ORTHOPAEDIC SURGERY
Payer: OTHER MISCELLANEOUS

## 2022-02-14 DIAGNOSIS — S46.911A STRAIN OF RIGHT SHOULDER, INITIAL ENCOUNTER: ICD-10-CM

## 2022-02-14 DIAGNOSIS — M25.511 ACUTE PAIN OF RIGHT SHOULDER: ICD-10-CM

## 2022-02-14 PROCEDURE — 73221 MRI JOINT UPR EXTREM W/O DYE: CPT | Mod: RT

## 2022-02-14 PROCEDURE — 73221 MRI JOINT UPR EXTREM W/O DYE: CPT | Mod: 26 | Performed by: RADIOLOGY

## 2022-03-03 ENCOUNTER — TELEPHONE (OUTPATIENT)
Dept: ORTHOPEDICS | Facility: CLINIC | Age: 27
End: 2022-03-03

## 2022-03-03 ENCOUNTER — OFFICE VISIT (OUTPATIENT)
Dept: ORTHOPEDICS | Facility: CLINIC | Age: 27
End: 2022-03-03
Payer: OTHER MISCELLANEOUS

## 2022-03-03 VITALS
HEIGHT: 66 IN | DIASTOLIC BLOOD PRESSURE: 70 MMHG | WEIGHT: 145 LBS | SYSTOLIC BLOOD PRESSURE: 106 MMHG | BODY MASS INDEX: 23.3 KG/M2

## 2022-03-03 DIAGNOSIS — S46.911D STRAIN OF RIGHT SHOULDER, SUBSEQUENT ENCOUNTER: Primary | ICD-10-CM

## 2022-03-03 PROCEDURE — 99213 OFFICE O/P EST LOW 20 MIN: CPT | Performed by: ORTHOPAEDIC SURGERY

## 2022-03-03 NOTE — LETTER
March 3, 2022    RE:  Faith Navarro                              [address] [place] [address]            To whom it may concern:    Faith Navarro is under my professional care for acute right shoulder pain after injury that occurred while at work. She may return to work with the following: The employee is ABLE to return to work 3/4/22 with no restrictions.    Please contact my office with any questions or concerns, faxed to [phone].         Sincerely,            Mahesh Chauhan MD

## 2022-03-03 NOTE — LETTER
"    3/3/2022         RE: Faith Navarro  [address] [place] [address]        Dear Colleague,    Thank you for referring your patient, Faith Navarro, to the [facility] [place]. Please see a copy of my visit note below.    HISTORY OF PRESENT ILLNESS:    Faith Navarro is a 26 year old female who is seen in follow up for right shoulder pain. Injury occurred on 1/19/22,  she is 6 weeks out from injury. Since her last office visit on 2/3/22 an MRI of the right shoulder has been obtained.  Present symptoms: Since patient's last office visit on 2/3/22 she reports improved right shoulder pain. Occasional pain with reaching and raising arm overhead. She reports she has been taken off the schedule at work, but feels like she is able to return.   Treatments tried to this point: Tylenol, PRN   Past Medical History: Unchanged from the visit of 1/27/22. Please refer to that note.     REVIEW OF SYSTEMS:  CONSTITUTIONAL:  NEGATIVE for fever, chills, change in weight  INTEGUMENTARY/SKIN:  NEGATIVE for worrisome rashes, moles or lesions  EYES:  NEGATIVE for vision changes or irritation  ENT/MOUTH:  NEGATIVE for ear, mouth and throat problems  RESP:  NEGATIVE for significant cough or SOB  BREAST:  NEGATIVE for masses, tenderness or discharge  CV:  NEGATIVE for chest pain, palpitations or peripheral edema  GI:  NEGATIVE for nausea, abdominal pain, heartburn, or change in bowel habits  :  Negative   MUSCULOSKELETAL:  See HPI above  NEURO:  NEGATIVE for weakness, dizziness or paresthesias  ENDOCRINE:  NEGATIVE for temperature intolerance, skin/hair changes  HEME/ALLERGY/IMMUNE:  NEGATIVE for bleeding problems  PSYCHIATRIC:  NEGATIVE for changes in mood or affect    PHYSICAL EXAM:  /70 (BP Location: Right arm, Patient Position: Chair, Cuff Size: Adult Regular)   Ht 1.687 m (5' 6.4\")   Wt 65.8 kg (145 lb)   BMI 23.12 kg/m    Body mass index is 23.12 kg/m .   GENERAL APPEARANCE: healthy, " alert and no distress   SKIN: no suspicious lesions or rashes and jaundice  NEURO: Normal strength and tone, mentation intact and speech normal  VASCULAR:  good pulses, and cappillary refill   LYMPH: no lymphadenopathy   PSYCH:  mentation appears normal and affect normal/bright    MSK:  Not in acute distress  Normal neck movement  Full active and passive range of motion, both shoulders  Minimal pain with a full forward elevation  Negative arm drop test  Minimal tenderness along the lateral deltoid as well as anterior deltoid, right shoulder  Isometric strength is full    IMAGING INTERPRETATION:    Narrative & Impression   EXAM: MR Right shoulder without  contrast 2/15/2022 8:03 AM     TECHNIQUE: Multiplanar, multisequence imaging of the right shoulder  were obtained without administration of intravenous or intra-articular  gadolinium contrast using routine protocol.     History: Shoulder pain, rotator cuff disorder suspected, xray done;  Acute pain of right shoulder; Strain of right shoulder, initial  encounter      Comparison: None     Findings:     ROTATOR CUFF and ASSOCIATED STRUCTURES  Rotator cuff: Supraspinatus, infraspinatus, teres minor and  subscapularis tendons are intact.      Bursa: Trace fluid in the subacromial-subdeltoid bursa.     Musculature: Muscle bulk of rotator cuff is preserved.  Deltoid muscle  bulk is also preserved.  No muscle edema.     Acromioclavicular joint  There are minimal degenerative changes of the acromioclavicular joint.  Acromion is type 2 in sagittal morphology.  Coracoacromial ligament is  not thickened.     OSSEOUS STRUCTURES  No fracture, marrow contusion or marrow infiltration.     LONG BICIPITAL TENDON  The long head of the biceps tendon is normally situated within the  bicipital groove. No complete or partial biceps tendon tear is  present.     GLENOHUMERAL JOINT  Joint fluid: Physiologic amount of joint fluid is present.     Cartilage and subarticular bone:  No focal  hyaline cartilage defects  are noted. No Hill-Sachs, reverse Hill-Sachs, or bony Bankart lesions  are seen.     Labrum: Limited assessment on this study with relative lack of joint  distention shows no labral tear.     ANCILLARY FINDINGS:  None                                                                      Impression:     Normal MRI of the right shoulder.     MIGUEL ANGEL GALLAGHER MD (Joe)           ASSESSMENT:  Resolved right shoulder strain  Normal MRI scan    PLAN:  The MRI scan images were visualized. Findings were thoroughly explained.  At this point, she can resume full activities as tolerated.  Follow-up as needed.           Mahesh Chauhan MD  Dept. Orthopedic Surgery  [facility]       Disclaimer: This note consists of symbols derived from keyboarding, dictation and/or voice recognition software. As a result, there may be errors in the script that have gone undetected. Please consider this when interpreting information found in this chart.        Again, thank you for allowing me to participate in the care of your patient.        Sincerely,        Mahesh Chauhan MD

## 2022-03-03 NOTE — TELEPHONE ENCOUNTER
Reason for Call:  Form, our goal is to have forms completed with 7 days, however, some forms may require a visit or additional information.    Type of letter, form or note:  FMLA     Who is the form from?: Insurance    Where did the form come from: form was faxed in    Phone number of person requesting form: [phone]  Can we leave a detailed message on this number:  NO    Desired completion date of form: 3/4/ 22     How will form be returned?:  fax to [phone]    Has the patient signed a consent form for release of information (may be included with form)? NO - patient reported injury as work comp at time of initial visit.     Additional comments: office visit scheduled for 3/3/22.     Form was started and place in Provider Basket for provider review/ completion at [facility].

## 2022-03-03 NOTE — PROGRESS NOTES
"HISTORY OF PRESENT ILLNESS:    Faith Navarro is a 26 year old female who is seen in follow up for right shoulder pain. Injury occurred on 1/19/22,  she is 6 weeks out from injury. Since her last office visit on 2/3/22 an MRI of the right shoulder has been obtained.  Present symptoms: Since patient's last office visit on 2/3/22 she reports improved right shoulder pain. Occasional pain with reaching and raising arm overhead. She reports she has been taken off the schedule at work, but feels like she is able to return.   Treatments tried to this point: Tylenol, PRN   Past Medical History: Unchanged from the visit of 1/27/22. Please refer to that note.     REVIEW OF SYSTEMS:  CONSTITUTIONAL:  NEGATIVE for fever, chills, change in weight  INTEGUMENTARY/SKIN:  NEGATIVE for worrisome rashes, moles or lesions  EYES:  NEGATIVE for vision changes or irritation  ENT/MOUTH:  NEGATIVE for ear, mouth and throat problems  RESP:  NEGATIVE for significant cough or SOB  BREAST:  NEGATIVE for masses, tenderness or discharge  CV:  NEGATIVE for chest pain, palpitations or peripheral edema  GI:  NEGATIVE for nausea, abdominal pain, heartburn, or change in bowel habits  :  Negative   MUSCULOSKELETAL:  See HPI above  NEURO:  NEGATIVE for weakness, dizziness or paresthesias  ENDOCRINE:  NEGATIVE for temperature intolerance, skin/hair changes  HEME/ALLERGY/IMMUNE:  NEGATIVE for bleeding problems  PSYCHIATRIC:  NEGATIVE for changes in mood or affect    PHYSICAL EXAM:  /70 (BP Location: Right arm, Patient Position: Chair, Cuff Size: Adult Regular)   Ht 1.687 m (5' 6.4\")   Wt 65.8 kg (145 lb)   BMI 23.12 kg/m    Body mass index is 23.12 kg/m .   GENERAL APPEARANCE: healthy, alert and no distress   SKIN: no suspicious lesions or rashes and jaundice  NEURO: Normal strength and tone, mentation intact and speech normal  VASCULAR:  good pulses, and cappillary refill   LYMPH: no lymphadenopathy   PSYCH:  mentation appears normal and " affect normal/bright    MSK:  Not in acute distress  Normal neck movement  Full active and passive range of motion, both shoulders  Minimal pain with a full forward elevation  Negative arm drop test  Minimal tenderness along the lateral deltoid as well as anterior deltoid, right shoulder  Isometric strength is full    IMAGING INTERPRETATION:    Narrative & Impression   EXAM: MR Right shoulder without  contrast 2/15/2022 8:03 AM     TECHNIQUE: Multiplanar, multisequence imaging of the right shoulder  were obtained without administration of intravenous or intra-articular  gadolinium contrast using routine protocol.     History: Shoulder pain, rotator cuff disorder suspected, xray done;  Acute pain of right shoulder; Strain of right shoulder, initial  encounter      Comparison: None     Findings:     ROTATOR CUFF and ASSOCIATED STRUCTURES  Rotator cuff: Supraspinatus, infraspinatus, teres minor and  subscapularis tendons are intact.      Bursa: Trace fluid in the subacromial-subdeltoid bursa.     Musculature: Muscle bulk of rotator cuff is preserved.  Deltoid muscle  bulk is also preserved.  No muscle edema.     Acromioclavicular joint  There are minimal degenerative changes of the acromioclavicular joint.  Acromion is type 2 in sagittal morphology.  Coracoacromial ligament is  not thickened.     OSSEOUS STRUCTURES  No fracture, marrow contusion or marrow infiltration.     LONG BICIPITAL TENDON  The long head of the biceps tendon is normally situated within the  bicipital groove. No complete or partial biceps tendon tear is  present.     GLENOHUMERAL JOINT  Joint fluid: Physiologic amount of joint fluid is present.     Cartilage and subarticular bone:  No focal hyaline cartilage defects  are noted. No Hill-Sachs, reverse Hill-Sachs, or bony Bankart lesions  are seen.     Labrum: Limited assessment on this study with relative lack of joint  distention shows no labral tear.     ANCILLARY FINDINGS:  None                                                                       Impression:     Normal MRI of the right shoulder.     MIGUEL ANGEL GALLAGHER MD (Joe)           ASSESSMENT:  Resolved right shoulder strain  Normal MRI scan    PLAN:  The MRI scan images were visualized. Findings were thoroughly explained.  At this point, she can resume full activities as tolerated.  Follow-up as needed.           Mahesh Chauhan MD  Dept. Orthopedic Surgery  [facility]       Disclaimer: This note consists of symbols derived from keyboarding, dictation and/or voice recognition software. As a result, there may be errors in the script that have gone undetected. Please consider this when interpreting information found in this chart.

## 2022-03-04 NOTE — TELEPHONE ENCOUNTER
Forms completed.   Writer did note injury occurred while at work, and patient to be out of work from 1/27-1/31/22.  Patient able to work with restrictions thereafter.     Fax confirmed via [employer].       Danitza Christopher ATC

## 2022-09-03 ENCOUNTER — HEALTH MAINTENANCE LETTER (OUTPATIENT)
Age: 27
End: 2022-09-03

## 2023-04-29 ENCOUNTER — HEALTH MAINTENANCE LETTER (OUTPATIENT)
Age: 28
End: 2023-04-29

## 2023-06-21 ENCOUNTER — NURSE TRIAGE (OUTPATIENT)
Dept: NURSING | Facility: CLINIC | Age: 28
End: 2023-06-21

## 2023-06-21 NOTE — TELEPHONE ENCOUNTER
Telephone call:    Patient is requesting additional information about paying for birth control out of pocket.  Patient reports she used to use an injectable birth control, but states that she stopped a while ago, but would like to go back on it.  However, she reports not having health insurance at the moment.    Please review & assist patient w/ questions.  Patient's call back # [phone].    Yoli Ruelas RN on 6/21/2023 at 11:15 AM    Reason for Disposition    [1] Caller requesting NON-URGENT health information AND [2] PCP's office is the best resource    Protocols used: INFORMATION ONLY CALL - NO TRIAGE-A-       negative No oral lesions; no gross abnormalities

## 2023-06-21 NOTE — TELEPHONE ENCOUNTER
Patient called back, advised that if she wanted to restart birth control she would need to schedule an appointment. Advised if patient is concerned about the cost she could [facility] an [facility]. Provided patient with the [facility] location number, she will contact them to ask more info.     JJ Watson on 6/21/2023 at 11:41 AM

## 2023-06-21 NOTE — TELEPHONE ENCOUNTER
Attempted to reach patient, LVMTCB. Per message below, if patient stopped BC and is wanting to restart, an appointment would be needed. Can offer [facility] and [facility] referral if preferred for low cost resources.     Norman ALFORD RN 6/21/2023 at 11:32 AM

## 2023-10-30 ENCOUNTER — OFFICE VISIT (OUTPATIENT)
Dept: FAMILY MEDICINE | Facility: CLINIC | Age: 28
End: 2023-10-30

## 2023-10-30 VITALS
RESPIRATION RATE: 24 BRPM | SYSTOLIC BLOOD PRESSURE: 116 MMHG | TEMPERATURE: 98.6 F | HEART RATE: 76 BPM | OXYGEN SATURATION: 100 % | DIASTOLIC BLOOD PRESSURE: 70 MMHG

## 2023-10-30 DIAGNOSIS — R11.2 NAUSEA AND VOMITING, UNSPECIFIED VOMITING TYPE: ICD-10-CM

## 2023-10-30 DIAGNOSIS — R10.30 LOWER ABDOMINAL PAIN: Primary | ICD-10-CM

## 2023-10-30 PROCEDURE — 99214 OFFICE O/P EST MOD 30 MIN: CPT | Performed by: PHYSICIAN ASSISTANT

## 2023-10-30 NOTE — PROGRESS NOTES
"  Assessment & Plan   Problem List Items Addressed This Visit    None  Visit Diagnoses       Lower abdominal pain    -  Primary    Nausea and vomiting, unspecified vomiting type               Impression is lower abdominal pain, N/V, dizziness and weakness of unknown etiology. She appears uncomfortable and tearful. Mildly tender lower abdomen. I feel she would best be cared for in the ER especially with the possibility of ectopic pregnancy. She tells me after talking to her fiance over the phone that she just wants a pregnancy test and then wants to go home, \"My fiance doesn't care about me.\" She confirmed she feels safe at home. EMS called and will transfer her to the ER.    35 minutes spent by me on the date of the encounter doing chart review, history and exam, documentation and further activities per the note     See Patient Instructions    MENDOZA Craven  [facility] CRISTINA Cuevas is a 28 year old, presenting for the following health issues:  Illness and Pregnancy        10/30/2023     8:30 AM   Additional Questions   Roomed by Lakeshia MARIO         10/30/2023     8:30 AM   Patient Reported Additional Medications   Patient reports taking the following new medications No new medications to add     HPI     Acute Illness    Onset/Duration: 3 days of lower abdominal pain, room spinning dizziness. Urinating more often and feeling lightheaded. No sore throat, cough, ill contacts, recent travel. No pain, but feels cold. UPT + 3 weeks ago, but negative last week. LMP 4 days ago.  Symptoms:  Fever: YES  Chills/Sweats: YES  Headache (location?): No  Sinus Pressure: No   Conjunctivitis:  No  Ear Pain: no  Rhinorrhea: No  Congestion: No  Sore Throat: No  Cough: no  Wheeze: No  Decreased Appetite: YES  Nausea: No  Vomiting: YES, 2-3 times a day.   Diarrhea: No  Dysuria/Freq.: No  Dysuria or Hematuria: No  Fatigue/Achiness: YES  Sick/Strep Exposure: No  Therapies tried and outcome: " None    Review of Systems   Constitutional, HEENT, cardiovascular, pulmonary, gi and gu systems are negative, except as otherwise noted.      Objective    /70 (BP Location: Left arm, Patient Position: Sitting, Cuff Size: Adult Regular)   Pulse 76   Temp 98.6  F (37  C) (Tympanic)   Resp 24   LMP 10/27/2023   SpO2 100%   There is no height or weight on file to calculate BMI.  Physical Exam  Vitals and nursing note reviewed.   Constitutional:       General: She is in acute distress (appears uncomfortable and tearful).      Appearance: She is not ill-appearing or diaphoretic.   HENT:      Head: Normocephalic and atraumatic.      Right Ear: Tympanic membrane, ear canal and external ear normal.      Left Ear: Tympanic membrane, ear canal and external ear normal.      Nose: Nose normal.      Mouth/Throat:      Mouth: Mucous membranes are moist.   Eyes:      Conjunctiva/sclera: Conjunctivae normal.   Cardiovascular:      Rate and Rhythm: Normal rate and regular rhythm.      Heart sounds: Normal heart sounds. No murmur heard.     No friction rub. No gallop.   Pulmonary:      Effort: Pulmonary effort is normal. No respiratory distress.      Breath sounds: Normal breath sounds. No stridor. No wheezing, rhonchi or rales.   Abdominal:      General: Bowel sounds are normal. There is no distension.      Palpations: Abdomen is soft. There is no mass.      Tenderness: There is abdominal tenderness (lower abdominal tenderness). There is no guarding or rebound.      Hernia: No hernia is present.   Skin:     General: Skin is warm and dry.   Neurological:      General: No focal deficit present.      Mental Status: She is alert. Mental status is at baseline.   Psychiatric:         Mood and Affect: Mood normal.         Behavior: Behavior normal.

## 2023-10-30 NOTE — PATIENT INSTRUCTIONS
PLEASE GO TO [facility] EMERGENCY ROOM IN [facility] RAPIDS IMMEDIATELY FOR EVALUATION. CALL 911 IF YOU HAVE WORSENING/CHANGING SYMPTOMS AT ANY TIME OR IF YOU CHANGE YOUR MIND ABOUT AMBULANCE TRANSPORT.

## 2024-01-29 ENCOUNTER — PATIENT OUTREACH (OUTPATIENT)
Dept: FAMILY MEDICINE | Facility: CLINIC | Age: 29
End: 2024-01-29

## 2024-01-29 NOTE — TELEPHONE ENCOUNTER
Patient Quality Outreach    Patient is due for the following:   Cervical Cancer Screening - PAP Needed  Physical Preventive Adult Physical      Topic Date Due    HPV Vaccine (1 - 2-dose series) Never done    Diptheria Tetanus Pertussis (DTAP/TDAP/TD) Vaccine (5 - Td or Tdap) 03/14/2023    Flu Vaccine (1) 09/01/2023    COVID-19 Vaccine (3 - 2023-24 season) 09/01/2023       Next Steps:   Schedule a Adult Preventative    Type of outreach:    Sent [employer] message.    Next Steps:  Reach out within 90 days via Phone and Letter.    Max number of attempts reached: No. Will try again in 90 days if patient still on fail list.    Questions for provider review:    None           Lidia Elizalde MA

## 2024-07-06 ENCOUNTER — HEALTH MAINTENANCE LETTER (OUTPATIENT)
Age: 29
End: 2024-07-06

## 2025-07-13 ENCOUNTER — HEALTH MAINTENANCE LETTER (OUTPATIENT)
Age: 30
End: 2025-07-13